# Patient Record
Sex: FEMALE | Race: WHITE | NOT HISPANIC OR LATINO | Employment: OTHER | URBAN - METROPOLITAN AREA
[De-identification: names, ages, dates, MRNs, and addresses within clinical notes are randomized per-mention and may not be internally consistent; named-entity substitution may affect disease eponyms.]

---

## 2020-01-28 ENCOUNTER — OFFICE VISIT (OUTPATIENT)
Dept: NEUROLOGY | Facility: CLINIC | Age: 65
End: 2020-01-28

## 2020-01-28 VITALS
SYSTOLIC BLOOD PRESSURE: 164 MMHG | DIASTOLIC BLOOD PRESSURE: 84 MMHG | HEIGHT: 65 IN | HEART RATE: 71 BPM | BODY MASS INDEX: 31.29 KG/M2 | WEIGHT: 187.8 LBS

## 2020-01-28 DIAGNOSIS — R53.83 FATIGUE, UNSPECIFIED TYPE: Primary | ICD-10-CM

## 2020-01-28 DIAGNOSIS — G44.219 EPISODIC TENSION-TYPE HEADACHE, NOT INTRACTABLE: ICD-10-CM

## 2020-01-28 PROBLEM — I49.9 IRREGULAR HEART RATE: Status: ACTIVE | Noted: 2020-01-28

## 2020-01-28 PROBLEM — I10 HYPERTENSION, BENIGN: Status: ACTIVE | Noted: 2020-01-28

## 2020-01-28 PROBLEM — E03.9 HYPOTHYROIDISM: Status: ACTIVE | Noted: 2020-01-28

## 2020-01-28 PROBLEM — K21.9 GERD (GASTROESOPHAGEAL REFLUX DISEASE): Status: ACTIVE | Noted: 2020-01-28

## 2020-01-28 PROCEDURE — 99203 OFFICE O/P NEW LOW 30 MIN: CPT | Performed by: PSYCHIATRY & NEUROLOGY

## 2020-01-28 RX ORDER — LEVOTHYROXINE SODIUM 50 MCG
TABLET ORAL
COMMUNITY
Start: 2020-01-10

## 2020-01-28 RX ORDER — FLUTICASONE PROPIONATE 50 MCG
SPRAY, SUSPENSION (ML) NASAL
COMMUNITY
Start: 2019-12-13

## 2020-01-28 RX ORDER — ASPIRIN 81 MG/1
81 TABLET, CHEWABLE ORAL DAILY
COMMUNITY

## 2020-01-28 RX ORDER — HYDROCHLOROTHIAZIDE 25 MG/1
TABLET ORAL
COMMUNITY
Start: 2020-01-27

## 2020-01-28 RX ORDER — POTASSIUM CITRATE 5 MEQ/1
TABLET, EXTENDED RELEASE ORAL
COMMUNITY
End: 2020-09-22

## 2020-01-28 RX ORDER — LATANOPROST 50 UG/ML
SOLUTION/ DROPS OPHTHALMIC
COMMUNITY
Start: 2020-01-03

## 2020-01-28 RX ORDER — ATENOLOL 25 MG/1
TABLET ORAL
COMMUNITY
Start: 2020-01-10 | End: 2022-12-24 | Stop reason: SDUPTHER

## 2020-01-28 RX ORDER — PANTOPRAZOLE SODIUM 40 MG/1
TABLET, DELAYED RELEASE ORAL
COMMUNITY
Start: 2020-01-02

## 2020-01-28 NOTE — PROGRESS NOTES
"Subjective: Headaches    Patient ID: Emir Kim is a 64 y.o. female.    CHIEF COMPLAINT: Frontal area eye sinus pain    New neuro    Frontal connelly, come and go, relieved with OTC medication infrequent  Probably more in spring and fall  One \"migraine\" light sensitive and nausea years ago.     Mother had recurrent headaches. When old when had strokes. At age 84,  at age 86    Patient is most concerned about her risk factors for stroke given her mother had strokes.  Patient is not diabetic.  She is on medication for hypertension.  She does not have hypercholesterolemia.  Her recent lipid panel was normal except for slightly elevated HDL.  She does take aspirin daily 81 mg.     Patient fatigue, and has woke her self up snoring,   has been told she had left deviated septum   averages 6-7 hours of sleep per night wakes up 1 time to use the bathroom.   Well rested during the day.    The following portions of the patient's history were reviewed and updated as appropriate: allergies, current medications, past family history, past medical history, past social history, past surgical history and problem list.      Family History   Problem Relation Age of Onset   • Stroke Mother    • Heart disease Father        Past Medical History:   Diagnosis Date   • Environmental and seasonal allergies    • Floppy mitral valve    • GERD (gastroesophageal reflux disease)    • Hypertension    • Hypothyroidism (acquired)    • Migraine        Social History     Socioeconomic History   • Marital status:      Spouse name: Not on file   • Number of children: Not on file   • Years of education: Not on file   • Highest education level: Not on file   Tobacco Use   • Smoking status: Never Smoker   • Smokeless tobacco: Never Used   Substance and Sexual Activity   • Alcohol use: Not Currently   • Drug use: Not Currently   • Sexual activity: Defer         Current Outpatient Medications:   •  aspirin 81 MG chewable tablet, Chew 81 mg Daily., " Disp: , Rfl:   •  atenolol (TENORMIN) 25 MG tablet, , Disp: , Rfl:   •  Calcium Carb-Cholecalciferol (CALCIUM 1000 + D PO), Take  by mouth., Disp: , Rfl:   •  Cholecalciferol (VITAMIN D3) 125 MCG (5000 UT) capsule capsule, Take 5,000 Units by mouth Daily., Disp: , Rfl:   •  fluticasone (FLONASE) 50 MCG/ACT nasal spray, , Disp: , Rfl:   •  hydroCHLOROthiazide (HYDRODIURIL) 25 MG tablet, , Disp: , Rfl:   •  latanoprost (XALATAN) 0.005 % ophthalmic solution, , Disp: , Rfl:   •  pantoprazole (PROTONIX) 40 MG EC tablet, , Disp: , Rfl:   •  potassium citrate (UROCIT-K) 5 MEQ (540 MG) CR tablet, Take  by mouth 3 (Three) Times a Day With Meals., Disp: , Rfl:   •  SYNTHROID 50 MCG tablet, , Disp: , Rfl:     Review of Systems   Constitutional: Positive for fatigue. Negative for appetite change.   HENT: Negative for congestion, sinus pressure and sinus pain.    Eyes: Positive for visual disturbance. Negative for pain and itching.   Respiratory: Negative for cough and shortness of breath.    Cardiovascular: Negative for chest pain and palpitations.   Gastrointestinal: Negative for constipation and diarrhea.   Endocrine: Negative for cold intolerance and heat intolerance.   Genitourinary: Negative for difficulty urinating and frequency.   Musculoskeletal: Positive for back pain. Negative for neck pain.   Allergic/Immunologic: Positive for environmental allergies.   Neurological: Positive for headaches. Negative for dizziness, tremors, seizures, syncope, facial asymmetry, speech difficulty, weakness, light-headedness and numbness.   Psychiatric/Behavioral: Negative for agitation and confusion.        I have reviewed ROS completed by medical assistant.     Objective:    Neurologic Exam     Mental Status   Oriented to person, place, and time.   Attention: normal.   Level of consciousness: alert    Cranial Nerves   Cranial nerves II through XII intact.     CN III, IV, VI   Pupils are equal, round, and reactive to light.  Extraocular  motions are normal.     Motor Exam   Muscle bulk: normal  Overall muscle tone: normal  Right arm tone: normal  Right arm pronator drift: absent  Left arm pronator drift: absent  Right leg tone: normal  Left leg tone: normal    Strength   Strength 5/5 throughout.     Sensory Exam   Light touch normal.   Proprioception normal.     Gait, Coordination, and Reflexes     Gait  Gait: normal    Coordination   Romberg: negative  Finger to nose coordination: normal    Reflexes   Reflexes 2+ except as noted.       Physical Exam   Constitutional: She is oriented to person, place, and time. She appears well-developed and well-nourished.   HENT:   Nose: Nose normal.   Mouth/Throat: Oropharynx is clear and moist.   Eyes: Pupils are equal, round, and reactive to light. Conjunctivae and EOM are normal.   Neck:   No carotid bruits   Cardiovascular: Normal rate, regular rhythm and normal heart sounds.   Pulmonary/Chest: Effort normal and breath sounds normal.   Musculoskeletal: Normal range of motion. She exhibits no edema or deformity.   Neurological: She is alert and oriented to person, place, and time. She has normal strength. She has a normal Finger-Nose-Finger Test and a normal Romberg Test. Gait normal.   Psychiatric: She has a normal mood and affect. Her behavior is normal.   Vitals reviewed.      Assessment/Plan:    Emir was seen today for headache.    Diagnoses and all orders for this visit:    Fatigue, unspecified type  -     Cancel: Vitamin B12; Future  -     Cancel: Methylmalonic Acid, Serum; Future  -     Vitamin B12; Future  -     Methylmalonic Acid, Serum; Future    Episodic tension-type headache, not intractable    This patient has minor tension headaches which do not require any treatment or further evaluation.    She is most concerned about her risk factors for stroke.  An MRI of the brain is not indicated as she has no indication for an MRI of the brain given her current symptoms and normal examination.  Imaging of  the brain will not reduce her risk for stroke or give any indication of her risk for stroke.  She apparently had an MRI of the brain some years in the past which showed some white matter changes which is common with microvascular disease.  The major points in stroke risk reduction were discussed.  These include #1 and most importantly controlling her blood pressure with a goal of 130/70 or less to eliminate any risk of stroke associated with hypertension.  #2 continue to monitor her blood sugar and lipids #3 she is encouraged to exercise with pacifically a aerobic exercise.  Currently she has no significant symptoms suggesting sleep disordered breathing however if she were to start snoring or have other indications of sleep apnea this would need to be investigated.  It has been recommended that the B12 level be above 500 to reduce risk of stroke therefore will order B12 level.  Lastly it is reasonable to obtain a carotid Doppler study which can be obtained most cheaply through the community screening programs    The patient was asked to follow-up with her primary care physician and return here as needed        EPWORTH SLEEPINESS SCALE  Sitting and reading  3  WatchingTV  3  Sitting, inactive, in a public place  0  As a passenger in a car for 1 hour w/o a break  0  Lying down to rest in the afternoon  0  Sitting and talking to someone  0  Sitting quietly after a lunch  0  In a car, while stopped for traffic or a light  0  Total 6        This document has been electronically signed by Joseph Seipel, MD on January 29, 2020 8:46 AM

## 2020-02-02 ENCOUNTER — RESULTS ENCOUNTER (OUTPATIENT)
Dept: NEUROLOGY | Facility: CLINIC | Age: 65
End: 2020-02-02

## 2020-02-02 DIAGNOSIS — R53.83 FATIGUE, UNSPECIFIED TYPE: ICD-10-CM

## 2020-09-16 RX ORDER — OMEGA-3S/DHA/EPA/FISH OIL/D3 300MG-1000
1 CAPSULE ORAL DAILY
COMMUNITY
End: 2020-09-22

## 2020-09-22 ENCOUNTER — OFFICE VISIT (OUTPATIENT)
Dept: ENDOCRINOLOGY | Facility: CLINIC | Age: 65
End: 2020-09-22

## 2020-09-22 VITALS
SYSTOLIC BLOOD PRESSURE: 155 MMHG | HEIGHT: 65 IN | HEART RATE: 64 BPM | WEIGHT: 186 LBS | BODY MASS INDEX: 30.99 KG/M2 | DIASTOLIC BLOOD PRESSURE: 90 MMHG | OXYGEN SATURATION: 97 % | TEMPERATURE: 97.5 F

## 2020-09-22 DIAGNOSIS — E04.0 SIMPLE GOITER: Primary | ICD-10-CM

## 2020-09-22 DIAGNOSIS — E03.9 ACQUIRED HYPOTHYROIDISM: ICD-10-CM

## 2020-09-22 PROCEDURE — 99203 OFFICE O/P NEW LOW 30 MIN: CPT | Performed by: INTERNAL MEDICINE

## 2020-09-22 RX ORDER — METHYLPREDNISOLONE 4 MG/1
TABLET ORAL
COMMUNITY
Start: 2020-07-01 | End: 2020-09-22

## 2020-09-22 RX ORDER — POTASSIUM BICARBONATE 782 MG/1
20 TABLET, EFFERVESCENT ORAL DAILY
COMMUNITY

## 2020-09-22 NOTE — PATIENT INSTRUCTIONS
Continue current medications  Follow-up as needed.  Please follow-up with your family physician regarding soft tissue in the neck which is most likely lipoma.  
done

## 2020-09-22 NOTE — PROGRESS NOTES
Endocrine Consult Outpatient  Referred by Dr. Boyer for thyroid consultation  Patient Care Team:  David Man MD as PCP - General  David Man MD as PCP - Claims Attributed     Chief Complaint: Goiter and hypothyroidism    HPI: 65-year-old female with history of hypertension and hypothyroidism started feeling a soft tissue in front of the neck about 6 to 7 months ago and she underwent testing including a thyroid ultrasound is now referred here for further evaluation and management.  With regards to hypothyroidism she has been on high levothyroxine for last 30 to 35 years.  She is taking it on regular basis.  Goiter: There is no family history of thyroid cancer or radiation exposure.  No trouble swallowing or choking and there is no persistent change in the voice or hoarseness.    Old records reviewed: A thyroid ultrasound done was May 20, 2020 showed a normal thyroid gland and no nodule was seen.  Right thyroid was about 4 cm in size and the left was about 3.7 cm in size and the estimates was 3.1 mm.  Labs from May 14, 2020 showed a TSH of 1.860.    Past Medical History:   Diagnosis Date   • Cataract    • Environmental and seasonal allergies    • Floppy mitral valve    • GERD (gastroesophageal reflux disease)    • Hypertension    • Hypothyroidism (acquired)    • Migraine        Social History     Socioeconomic History   • Marital status:      Spouse name: Not on file   • Number of children: Not on file   • Years of education: Not on file   • Highest education level: Not on file   Tobacco Use   • Smoking status: Never Smoker   • Smokeless tobacco: Never Used   Substance and Sexual Activity   • Alcohol use: Not Currently   • Drug use: Not Currently   • Sexual activity: Defer       Family History   Problem Relation Age of Onset   • Stroke Mother    • Hypertension Mother    • Thyroid disease Mother    • Atrial fibrillation Mother    • Heart disease Father    • Diabetes Father    • Heart  failure Father        Allergies   Allergen Reactions   • Nitrofurantoin Nausea Only   • Penicillins Hives   • Sulfa Antibiotics Hives   • Sulfamethoxazole-Trimethoprim GI Intolerance       ROS:   Constitutional:  Denies fatigue, tiredness.    Eyes:  Denies change in visual acuity   HENT:  Denies nasal congestion or sore throat   Respiratory: denies cough, shortness of breath.   Cardiovascular:  denies chest pain, edema   GI:  Denies abdominal pain, nausea, vomiting.    :  Denies dysuria   Musculoskeletal:  Denies back pain or joint pain   Integument:  Denies dry skin, rash   Neurologic:  Denies headache, focal weakness or sensory changes   Endocrine:  Denies polyuria or polydipsia   Psychiatric:  Denies depression or anxiety      Vitals:    09/22/20 0836   BP: 155/90   Pulse: 64   Temp: 97.5 °F (36.4 °C)   SpO2: 97%        Physical Exam:  GEN: NAD, conversant  EYES: EOMI, PERRL, no conjunctival erythema  NECK: no thyromegaly, full ROM   CV: RRR, no murmurs/rubs/gallops, no peripheral edema  LUNG: CTAB, no wheezes/rales/ronchi  SKIN: no rashes, no acanthosis  MSK: no deformities, full ROM of all extremities  NEURO: no tremors, DTR normal  PSYCH: AOX3, appropriate mood, affect normal      Results Review:     I reviewed the patient's new clinical results.    Medication Review: Reviewed.       Current Outpatient Medications:   •  aspirin 81 MG chewable tablet, Chew 81 mg Daily., Disp: , Rfl:   •  atenolol (TENORMIN) 25 MG tablet, , Disp: , Rfl:   •  Cholecalciferol (VITAMIN D3) 125 MCG (5000 UT) capsule capsule, Take 5,000 Units by mouth Daily., Disp: , Rfl:   •  fluticasone (FLONASE) 50 MCG/ACT nasal spray, , Disp: , Rfl:   •  hydroCHLOROthiazide (HYDRODIURIL) 25 MG tablet, 1/2 tablet daily, Disp: , Rfl:   •  latanoprost (XALATAN) 0.005 % ophthalmic solution, , Disp: , Rfl:   •  pantoprazole (PROTONIX) 40 MG EC tablet, , Disp: , Rfl:   •  Potassium Bicarb-Citric Acid (Effer-K) 20 MEQ effervescent tablet, Take 20 mEq  by mouth Daily., Disp: , Rfl:   •  SYNTHROID 50 MCG tablet, , Disp: , Rfl:     Assessment/Plan   1.  Goiter: She has a small soft tissue in front of the neck right below the thyroid area which is most likely a small fat accumulation.  Ultrasound did not show any nodules.  I have reassured her that this is most likely benign fat accumulation and she can wait and watch and see if it grows then option would be to take it out surgically.  She verbalized understanding.    2.  Hypothyroidism: Her TSH is normal so at this time we will continue levothyroxine 50 mcg p.o. daily and she can follow-up with her family physician.       Mario Ware MD FACE.

## 2020-11-02 DIAGNOSIS — E03.9 ACQUIRED HYPOTHYROIDISM: Primary | ICD-10-CM

## 2020-11-02 DIAGNOSIS — E04.0 SIMPLE GOITER: ICD-10-CM

## 2020-11-10 LAB
ACTH PLAS-MCNC: NORMAL PG/ML
CORTIS SERPL-MCNC: 15.1 UG/DL
IGF-I SERPL-MCNC: 96 NG/ML (ref 52–196)
PROLACTIN SERPL-MCNC: 10.3 NG/ML (ref 4.8–23.3)
REQUEST PROBLEM: NORMAL
T4 FREE SERPL-MCNC: 1.22 NG/DL (ref 0.82–1.77)
TSH SERPL DL<=0.005 MIU/L-ACNC: 1.18 UIU/ML (ref 0.45–4.5)

## 2020-11-11 ENCOUNTER — TELEPHONE (OUTPATIENT)
Dept: ENDOCRINOLOGY | Facility: CLINIC | Age: 65
End: 2020-11-11

## 2020-11-11 NOTE — TELEPHONE ENCOUNTER
[facility] Please advise.

## 2020-11-12 NOTE — TELEPHONE ENCOUNTER
We checked cortisol, thyroid test, prolactin and growth hormone and they were all normal.  Please notify patient.

## 2021-04-19 ENCOUNTER — HOSPITAL ENCOUNTER (OUTPATIENT)
Dept: CARDIOLOGY | Facility: HOSPITAL | Age: 66
Discharge: HOME | End: 2021-04-19
Admitting: GENERAL PRACTICE

## 2021-04-19 VITALS — WEIGHT: 186 LBS | BODY MASS INDEX: 30.99 KG/M2 | HEIGHT: 65 IN

## 2021-04-19 DIAGNOSIS — I07.1 RHEUMATIC TRICUSPID VALVE REGURGITATION: ICD-10-CM

## 2021-04-19 DIAGNOSIS — I34.89 MYXOID TRANSFORMATION OF MITRAL VALVE: ICD-10-CM

## 2021-04-19 PROCEDURE — 93306 TTE W/DOPPLER COMPLETE: CPT

## 2021-04-19 PROCEDURE — 93306 TTE W/DOPPLER COMPLETE: CPT | Performed by: INTERNAL MEDICINE

## 2021-04-20 LAB
ASCENDING AORTA: 2.9 CM
BH CV ECHO MEAS - ACS: 2.1 CM
BH CV ECHO MEAS - AI DEC SLOPE: 248.7 CM/SEC^2
BH CV ECHO MEAS - AI DEC TIME: 1.5 SEC
BH CV ECHO MEAS - AI MAX PG: 57.5 MMHG
BH CV ECHO MEAS - AI MAX VEL: 379 CM/SEC
BH CV ECHO MEAS - AI P1/2T: 446.4 MSEC
BH CV ECHO MEAS - AO MAX PG (FULL): 2.9 MMHG
BH CV ECHO MEAS - AO MAX PG: 7.3 MMHG
BH CV ECHO MEAS - AO MEAN PG (FULL): 1.9 MMHG
BH CV ECHO MEAS - AO MEAN PG: 4.3 MMHG
BH CV ECHO MEAS - AO ROOT AREA (BSA CORRECTED): 1.5
BH CV ECHO MEAS - AO ROOT AREA: 6.9 CM^2
BH CV ECHO MEAS - AO ROOT DIAM: 3 CM
BH CV ECHO MEAS - AO V2 MAX: 135.1 CM/SEC
BH CV ECHO MEAS - AO V2 MEAN: 99.8 CM/SEC
BH CV ECHO MEAS - AO V2 VTI: 29.9 CM
BH CV ECHO MEAS - ASC AORTA: 2.9 CM
BH CV ECHO MEAS - AVA(I,A): 2.7 CM^2
BH CV ECHO MEAS - AVA(I,D): 2.7 CM^2
BH CV ECHO MEAS - AVA(V,A): 2.3 CM^2
BH CV ECHO MEAS - AVA(V,D): 2.3 CM^2
BH CV ECHO MEAS - BSA(HAYCOCK): 2 M^2
BH CV ECHO MEAS - BSA: 1.9 M^2
BH CV ECHO MEAS - BZI_BMI: 31 KILOGRAMS/M^2
BH CV ECHO MEAS - BZI_METRIC_HEIGHT: 165.1 CM
BH CV ECHO MEAS - BZI_METRIC_WEIGHT: 84.4 KG
BH CV ECHO MEAS - EDV(CUBED): 58.1 ML
BH CV ECHO MEAS - EDV(MOD-SP2): 80.8 ML
BH CV ECHO MEAS - EDV(MOD-SP4): 74.7 ML
BH CV ECHO MEAS - EDV(TEICH): 64.8 ML
BH CV ECHO MEAS - EF(CUBED): 72.2 %
BH CV ECHO MEAS - EF(MOD-BP): 59 %
BH CV ECHO MEAS - EF(MOD-SP2): 56.2 %
BH CV ECHO MEAS - EF(MOD-SP4): 64.4 %
BH CV ECHO MEAS - EF(TEICH): 64.6 %
BH CV ECHO MEAS - ESV(CUBED): 16.1 ML
BH CV ECHO MEAS - ESV(MOD-SP2): 35.4 ML
BH CV ECHO MEAS - ESV(MOD-SP4): 26.6 ML
BH CV ECHO MEAS - ESV(TEICH): 22.9 ML
BH CV ECHO MEAS - FS: 34.8 %
BH CV ECHO MEAS - IVS/LVPW: 1.2
BH CV ECHO MEAS - IVSD: 1.4 CM
BH CV ECHO MEAS - LA DIMENSION(2D): 3.9 CM
BH CV ECHO MEAS - LA DIMENSION: 4.2 CM
BH CV ECHO MEAS - LA/AO: 1.4
BH CV ECHO MEAS - LAT PEAK E' VEL: 7 CM/SEC
BH CV ECHO MEAS - LV DIASTOLIC VOL/BSA (35-75): 38.9 ML/M^2
BH CV ECHO MEAS - LV MASS(C)D: 171.2 GRAMS
BH CV ECHO MEAS - LV MASS(C)DI: 89.2 GRAMS/M^2
BH CV ECHO MEAS - LV MAX PG: 4.4 MMHG
BH CV ECHO MEAS - LV MEAN PG: 2.4 MMHG
BH CV ECHO MEAS - LV SYSTOLIC VOL/BSA (12-30): 13.8 ML/M^2
BH CV ECHO MEAS - LV V1 MAX: 104.3 CM/SEC
BH CV ECHO MEAS - LV V1 MEAN: 72.7 CM/SEC
BH CV ECHO MEAS - LV V1 VTI: 26.6 CM
BH CV ECHO MEAS - LVIDD: 3.9 CM
BH CV ECHO MEAS - LVIDS: 2.5 CM
BH CV ECHO MEAS - LVOT AREA: 3 CM^2
BH CV ECHO MEAS - LVOT DIAM: 2 CM
BH CV ECHO MEAS - LVPWD: 1.2 CM
BH CV ECHO MEAS - MED PEAK E' VEL: 6 CM/SEC
BH CV ECHO MEAS - MV A MAX VEL: 80.1 CM/SEC
BH CV ECHO MEAS - MV DEC SLOPE: 353.9 CM/SEC^2
BH CV ECHO MEAS - MV DEC TIME: 0.23 SEC
BH CV ECHO MEAS - MV E MAX VEL: 79.8 CM/SEC
BH CV ECHO MEAS - MV E/A: 1
BH CV ECHO MEAS - MV MAX PG: 2.8 MMHG
BH CV ECHO MEAS - MV MEAN PG: 0.95 MMHG
BH CV ECHO MEAS - MV P1/2T: 57 MSEC
BH CV ECHO MEAS - MV V2 MAX: 83 CM/SEC
BH CV ECHO MEAS - MV V2 MEAN: 42.8 CM/SEC
BH CV ECHO MEAS - MV V2 VTI: 19.4 CM
BH CV ECHO MEAS - MVA(P1/2T): 3.9 CM2
BH CV ECHO MEAS - MVA(VTI): 4.1 CM^2
BH CV ECHO MEAS - PA ACC TIME: 0.15 SEC
BH CV ECHO MEAS - PA MAX PG (FULL): 0.54 MMHG
BH CV ECHO MEAS - PA MAX PG: 2 MMHG
BH CV ECHO MEAS - PA MEAN PG (FULL): 0.36 MMHG
BH CV ECHO MEAS - PA MEAN PG: 1.3 MMHG
BH CV ECHO MEAS - PA PR(ACCEL): 13.5 MMHG
BH CV ECHO MEAS - PA V2 MAX: 69.9 CM/SEC
BH CV ECHO MEAS - PA V2 MEAN: 55.8 CM/SEC
BH CV ECHO MEAS - PA V2 VTI: 17 CM
BH CV ECHO MEAS - PAPD(PI EDV): 10 MMHG
BH CV ECHO MEAS - PI END-D VEL: 72.9 CM/SEC
BH CV ECHO MEAS - PULM A REVS DUR: 0.09 SEC
BH CV ECHO MEAS - PULM A REVS VEL: 23.1 CM/SEC
BH CV ECHO MEAS - PULM DIAS VEL: 40.7 CM/SEC
BH CV ECHO MEAS - PULM S/D: 1.8
BH CV ECHO MEAS - PULM SYS VEL: 72.5 CM/SEC
BH CV ECHO MEAS - RAP SYSTOLE: 8 MMHG
BH CV ECHO MEAS - RV MAX PG: 1.4 MMHG
BH CV ECHO MEAS - RV MEAN PG: 0.95 MMHG
BH CV ECHO MEAS - RV V1 MAX: 59.6 CM/SEC
BH CV ECHO MEAS - RV V1 MEAN: 46.8 CM/SEC
BH CV ECHO MEAS - RV V1 VTI: 16 CM
BH CV ECHO MEAS - RVSP: 33.6 MMHG
BH CV ECHO MEAS - SI(AO): 108 ML/M^2
BH CV ECHO MEAS - SI(CUBED): 21.9 ML/M^2
BH CV ECHO MEAS - SI(LVOT): 41.6 ML/M^2
BH CV ECHO MEAS - SI(MOD-SP2): 23.7 ML/M^2
BH CV ECHO MEAS - SI(MOD-SP4): 25.1 ML/M^2
BH CV ECHO MEAS - SI(TEICH): 21.9 ML/M^2
BH CV ECHO MEAS - SV(AO): 207.2 ML
BH CV ECHO MEAS - SV(CUBED): 42 ML
BH CV ECHO MEAS - SV(LVOT): 79.8 ML
BH CV ECHO MEAS - SV(MOD-SP2): 45.4 ML
BH CV ECHO MEAS - SV(MOD-SP4): 48.1 ML
BH CV ECHO MEAS - SV(TEICH): 41.9 ML
BH CV ECHO MEAS - TAPSE (>1.6): 1.9 CM
BH CV ECHO MEAS - TR MAX PG: 26 MMHG
BH CV ECHO MEAS - TR MAX VEL: 253.1 CM/SEC
BH CV ECHO MEASUREMENTS AVERAGE E/E' RATIO: 12.28
BH CV XLRA - RV BASE: 3.4 CM
BH CV XLRA - RV MID: 2.8 CM
BH CV XLRA - TDI S': 11 CM/SEC
LEFT ATRIUM VOLUME INDEX: 30 ML/M2
LEFT ATRIUM VOLUME: 57 CM3
LV EF 2D ECHO EST: 60 %

## 2021-04-26 ENCOUNTER — APPOINTMENT (OUTPATIENT)
Dept: CARDIOLOGY | Facility: HOSPITAL | Age: 66
End: 2021-04-26

## 2022-05-02 ENCOUNTER — HOSPITAL ENCOUNTER (OUTPATIENT)
Dept: CARDIOLOGY | Facility: HOSPITAL | Age: 67
Discharge: HOME | End: 2022-05-02
Admitting: INTERNAL MEDICINE

## 2022-05-02 VITALS
BODY MASS INDEX: 31 KG/M2 | WEIGHT: 186.07 LBS | HEIGHT: 65 IN | SYSTOLIC BLOOD PRESSURE: 173 MMHG | DIASTOLIC BLOOD PRESSURE: 83 MMHG

## 2022-05-02 DIAGNOSIS — I49.9 IRREGULAR HEART RATE: ICD-10-CM

## 2022-05-02 PROCEDURE — 93306 TTE W/DOPPLER COMPLETE: CPT | Performed by: INTERNAL MEDICINE

## 2022-05-02 PROCEDURE — 93306 TTE W/DOPPLER COMPLETE: CPT

## 2022-05-04 LAB
BH CV ECHO MEAS - ACS: 1.56 CM
BH CV ECHO MEAS - AI P1/2T: 703.4 MSEC
BH CV ECHO MEAS - AO MAX PG: 9.4 MMHG
BH CV ECHO MEAS - AO MEAN PG: 4.4 MMHG
BH CV ECHO MEAS - AO ROOT DIAM: 2.8 CM
BH CV ECHO MEAS - AO V2 MAX: 153.5 CM/SEC
BH CV ECHO MEAS - AO V2 VTI: 34.8 CM
BH CV ECHO MEAS - AVA(I,D): 2.4 CM2
BH CV ECHO MEAS - EDV(CUBED): 83.3 ML
BH CV ECHO MEAS - EDV(MOD-SP4): 62.2 ML
BH CV ECHO MEAS - EF(MOD-BP): 65 %
BH CV ECHO MEAS - EF(MOD-SP4): 62.8 %
BH CV ECHO MEAS - ESV(CUBED): 23.9 ML
BH CV ECHO MEAS - ESV(MOD-SP4): 23.1 ML
BH CV ECHO MEAS - FS: 34.1 %
BH CV ECHO MEAS - IVS/LVPW: 1.03 CM
BH CV ECHO MEAS - IVSD: 1.05 CM
BH CV ECHO MEAS - LA DIMENSION: 3.6 CM
BH CV ECHO MEAS - LV DIASTOLIC VOL/BSA (35-75): 33.7 CM2
BH CV ECHO MEAS - LV MASS(C)D: 153.5 GRAMS
BH CV ECHO MEAS - LV MAX PG: 4.4 MMHG
BH CV ECHO MEAS - LV MEAN PG: 2.8 MMHG
BH CV ECHO MEAS - LV SYSTOLIC VOL/BSA (12-30): 12.5 CM2
BH CV ECHO MEAS - LV V1 MAX: 105.4 CM/SEC
BH CV ECHO MEAS - LV V1 VTI: 27.9 CM
BH CV ECHO MEAS - LVIDD: 4.4 CM
BH CV ECHO MEAS - LVIDS: 2.9 CM
BH CV ECHO MEAS - LVOT AREA: 3 CM2
BH CV ECHO MEAS - LVOT DIAM: 1.95 CM
BH CV ECHO MEAS - LVPWD: 1.02 CM
BH CV ECHO MEAS - MR MAX PG: 115.3 MMHG
BH CV ECHO MEAS - MR MAX VEL: 536.9 CM/SEC
BH CV ECHO MEAS - MV A MAX VEL: 83.2 CM/SEC
BH CV ECHO MEAS - MV DEC SLOPE: 464.1 CM/SEC2
BH CV ECHO MEAS - MV DEC TIME: 0.21 MSEC
BH CV ECHO MEAS - MV E MAX VEL: 97.5 CM/SEC
BH CV ECHO MEAS - MV E/A: 1.17
BH CV ECHO MEAS - MV MAX PG: 4.9 MMHG
BH CV ECHO MEAS - MV MEAN PG: 1.8 MMHG
BH CV ECHO MEAS - MV V2 VTI: 28.9 CM
BH CV ECHO MEAS - MVA(VTI): 2.9 CM2
BH CV ECHO MEAS - PA ACC TIME: 0.14 SEC
BH CV ECHO MEAS - PA PR(ACCEL): 16.1 MMHG
BH CV ECHO MEAS - PA V2 MAX: 88.7 CM/SEC
BH CV ECHO MEAS - PI END-D VEL: 71.1 CM/SEC
BH CV ECHO MEAS - PULM A REVS DUR: 0.15 SEC
BH CV ECHO MEAS - PULM A REVS VEL: 32.9 CM/SEC
BH CV ECHO MEAS - PULM DIAS VEL: 41.3 CM/SEC
BH CV ECHO MEAS - PULM S/D: 2.01
BH CV ECHO MEAS - PULM SYS VEL: 83 CM/SEC
BH CV ECHO MEAS - RAP SYSTOLE: 10 MMHG
BH CV ECHO MEAS - RVSP: 42.1 MMHG
BH CV ECHO MEAS - SI(MOD-SP4): 21.2 ML/M2
BH CV ECHO MEAS - SV(LVOT): 83.6 ML
BH CV ECHO MEAS - SV(MOD-SP4): 39.1 ML
BH CV ECHO MEAS - TAPSE (>1.6): 1.9 CM
BH CV ECHO MEAS - TR MAX PG: 32.1 MMHG
BH CV ECHO MEAS - TR MAX VEL: 283.1 CM/SEC
BH CV XLRA - RV BASE: 3.7 CM
BH CV XLRA - RV MID: 2.3 CM
LV EF 2D ECHO EST: 65 %
MAXIMAL PREDICTED HEART RATE: 152 BPM
STRESS TARGET HR: 129 BPM

## 2022-11-16 ENCOUNTER — OFFICE (OUTPATIENT)
Dept: URBAN - METROPOLITAN AREA CLINIC 64 | Facility: CLINIC | Age: 67
End: 2022-11-16

## 2022-11-16 VITALS
DIASTOLIC BLOOD PRESSURE: 75 MMHG | WEIGHT: 170 LBS | HEART RATE: 71 BPM | HEIGHT: 65 IN | SYSTOLIC BLOOD PRESSURE: 154 MMHG

## 2022-11-16 DIAGNOSIS — A04.72 ENTEROCOLITIS DUE TO CLOSTRIDIUM DIFFICILE, NOT SPECIFIED AS: ICD-10-CM

## 2022-11-16 DIAGNOSIS — R19.7 DIARRHEA, UNSPECIFIED: ICD-10-CM

## 2022-11-16 PROCEDURE — 99203 OFFICE O/P NEW LOW 30 MIN: CPT | Performed by: NURSE PRACTITIONER

## 2022-11-16 RX ORDER — SACCHAROMYCES BOULARDII 50 MG
500 CAPSULE ORAL
Qty: 60 | Refills: 4 | Status: COMPLETED
Start: 2022-11-16 | End: 2023-03-06

## 2022-11-17 ENCOUNTER — OFFICE (OUTPATIENT)
Dept: URBAN - METROPOLITAN AREA LAB 2 | Facility: LAB | Age: 67
End: 2022-11-17

## 2022-11-17 DIAGNOSIS — R19.7 DIARRHEA, UNSPECIFIED: ICD-10-CM

## 2022-11-17 DIAGNOSIS — A08.11 ACUTE GASTROENTEROPATHY DUE TO NORWALK AGENT: ICD-10-CM

## 2022-11-17 DIAGNOSIS — A02.9 SALMONELLA INFECTION, UNSPECIFIED: ICD-10-CM

## 2022-11-17 PROCEDURE — 87505 NFCT AGENT DETECTION GI: CPT | Performed by: NURSE PRACTITIONER

## 2022-11-17 PROCEDURE — 87449 NOS EACH ORGANISM AG IA: CPT | Performed by: NURSE PRACTITIONER

## 2022-11-17 PROCEDURE — 87324 CLOSTRIDIUM AG IA: CPT | Mod: 59 | Performed by: NURSE PRACTITIONER

## 2022-12-24 ENCOUNTER — HOSPITAL ENCOUNTER (EMERGENCY)
Facility: HOSPITAL | Age: 67
Discharge: HOME | End: 2022-12-24
Attending: EMERGENCY MEDICINE | Admitting: EMERGENCY MEDICINE

## 2022-12-24 ENCOUNTER — APPOINTMENT (OUTPATIENT)
Dept: GENERAL RADIOLOGY | Facility: HOSPITAL | Age: 67
End: 2022-12-24

## 2022-12-24 VITALS
SYSTOLIC BLOOD PRESSURE: 170 MMHG | DIASTOLIC BLOOD PRESSURE: 79 MMHG | RESPIRATION RATE: 16 BRPM | WEIGHT: 172.18 LBS | TEMPERATURE: 97.6 F | BODY MASS INDEX: 28.69 KG/M2 | HEIGHT: 65 IN | HEART RATE: 82 BPM | OXYGEN SATURATION: 98 %

## 2022-12-24 DIAGNOSIS — I10 ASYMPTOMATIC HYPERTENSION: Primary | ICD-10-CM

## 2022-12-24 DIAGNOSIS — E87.6 HYPOKALEMIA: ICD-10-CM

## 2022-12-24 DIAGNOSIS — E87.1 HYPONATREMIA: ICD-10-CM

## 2022-12-24 LAB
ALBUMIN SERPL-MCNC: 4.2 G/DL (ref 3.5–5.2)
ALBUMIN/GLOB SERPL: 1.5 G/DL
ALP SERPL-CCNC: 98 U/L (ref 39–117)
ALT SERPL W P-5'-P-CCNC: 13 U/L (ref 1–33)
ANION GAP SERPL CALCULATED.3IONS-SCNC: 12 MMOL/L (ref 5–15)
AST SERPL-CCNC: 14 U/L (ref 1–32)
BASOPHILS # BLD AUTO: 0 10*3/MM3 (ref 0–0.2)
BASOPHILS NFR BLD AUTO: 0.5 % (ref 0–1.5)
BILIRUB SERPL-MCNC: 0.4 MG/DL (ref 0–1.2)
BUN SERPL-MCNC: 6 MG/DL (ref 8–23)
BUN/CREAT SERPL: 8.1 (ref 7–25)
CALCIUM SPEC-SCNC: 9 MG/DL (ref 8.6–10.5)
CHLORIDE SERPL-SCNC: 85 MMOL/L (ref 98–107)
CO2 SERPL-SCNC: 26 MMOL/L (ref 22–29)
CREAT SERPL-MCNC: 0.74 MG/DL (ref 0.57–1)
DEPRECATED RDW RBC AUTO: 43.3 FL (ref 37–54)
EGFRCR SERPLBLD CKD-EPI 2021: 88.3 ML/MIN/1.73
EOSINOPHIL # BLD AUTO: 0 10*3/MM3 (ref 0–0.4)
EOSINOPHIL NFR BLD AUTO: 0.2 % (ref 0.3–6.2)
ERYTHROCYTE [DISTWIDTH] IN BLOOD BY AUTOMATED COUNT: 13.5 % (ref 12.3–15.4)
GLOBULIN UR ELPH-MCNC: 2.8 GM/DL
GLUCOSE SERPL-MCNC: 104 MG/DL (ref 65–99)
HCT VFR BLD AUTO: 40 % (ref 34–46.6)
HGB BLD-MCNC: 13.5 G/DL (ref 12–15.9)
HOLD SPECIMEN: NORMAL
HOLD SPECIMEN: NORMAL
LYMPHOCYTES # BLD AUTO: 1.3 10*3/MM3 (ref 0.7–3.1)
LYMPHOCYTES NFR BLD AUTO: 23.5 % (ref 19.6–45.3)
MAGNESIUM SERPL-MCNC: 1.8 MG/DL (ref 1.6–2.4)
MCH RBC QN AUTO: 31.5 PG (ref 26.6–33)
MCHC RBC AUTO-ENTMCNC: 33.7 G/DL (ref 31.5–35.7)
MCV RBC AUTO: 93.4 FL (ref 79–97)
MONOCYTES # BLD AUTO: 0.4 10*3/MM3 (ref 0.1–0.9)
MONOCYTES NFR BLD AUTO: 7.9 % (ref 5–12)
NEUTROPHILS NFR BLD AUTO: 3.8 10*3/MM3 (ref 1.7–7)
NEUTROPHILS NFR BLD AUTO: 67.9 % (ref 42.7–76)
NRBC BLD AUTO-RTO: 0 /100 WBC (ref 0–0.2)
PLATELET # BLD AUTO: 286 10*3/MM3 (ref 140–450)
PMV BLD AUTO: 6.4 FL (ref 6–12)
POTASSIUM SERPL-SCNC: 3.2 MMOL/L (ref 3.5–5.2)
PROT SERPL-MCNC: 7 G/DL (ref 6–8.5)
RBC # BLD AUTO: 4.28 10*6/MM3 (ref 3.77–5.28)
SODIUM SERPL-SCNC: 123 MMOL/L (ref 136–145)
TROPONIN T SERPL-MCNC: <0.01 NG/ML (ref 0–0.03)
TSH SERPL DL<=0.05 MIU/L-ACNC: 2.33 UIU/ML (ref 0.27–4.2)
WBC NRBC COR # BLD: 5.6 10*3/MM3 (ref 3.4–10.8)
WHOLE BLOOD HOLD COAG: NORMAL
WHOLE BLOOD HOLD SPECIMEN: NORMAL

## 2022-12-24 PROCEDURE — 93005 ELECTROCARDIOGRAM TRACING: CPT | Performed by: PHYSICIAN ASSISTANT

## 2022-12-24 PROCEDURE — 71045 X-RAY EXAM CHEST 1 VIEW: CPT

## 2022-12-24 PROCEDURE — 84443 ASSAY THYROID STIM HORMONE: CPT | Performed by: PHYSICIAN ASSISTANT

## 2022-12-24 PROCEDURE — 83735 ASSAY OF MAGNESIUM: CPT | Performed by: PHYSICIAN ASSISTANT

## 2022-12-24 PROCEDURE — 99283 EMERGENCY DEPT VISIT LOW MDM: CPT

## 2022-12-24 PROCEDURE — 99284 EMERGENCY DEPT VISIT MOD MDM: CPT

## 2022-12-24 PROCEDURE — 80053 COMPREHEN METABOLIC PANEL: CPT | Performed by: PHYSICIAN ASSISTANT

## 2022-12-24 PROCEDURE — 84484 ASSAY OF TROPONIN QUANT: CPT | Performed by: PHYSICIAN ASSISTANT

## 2022-12-24 PROCEDURE — 96374 THER/PROPH/DIAG INJ IV PUSH: CPT

## 2022-12-24 PROCEDURE — 85025 COMPLETE CBC W/AUTO DIFF WBC: CPT | Performed by: PHYSICIAN ASSISTANT

## 2022-12-24 RX ORDER — ATENOLOL 25 MG/1
50 TABLET ORAL DAILY
Qty: 14 TABLET | Refills: 0 | Status: SHIPPED | OUTPATIENT
Start: 2022-12-24 | End: 2022-12-31

## 2022-12-24 RX ORDER — SODIUM CHLORIDE 0.9 % (FLUSH) 0.9 %
10 SYRINGE (ML) INJECTION AS NEEDED
Status: DISCONTINUED | OUTPATIENT
Start: 2022-12-24 | End: 2022-12-25 | Stop reason: HOSPADM

## 2022-12-24 RX ORDER — LABETALOL HYDROCHLORIDE 5 MG/ML
20 INJECTION, SOLUTION INTRAVENOUS ONCE
Status: COMPLETED | OUTPATIENT
Start: 2022-12-24 | End: 2022-12-24

## 2022-12-24 RX ORDER — POTASSIUM CHLORIDE 20 MEQ/1
40 TABLET, EXTENDED RELEASE ORAL DAILY
Status: DISCONTINUED | OUTPATIENT
Start: 2022-12-24 | End: 2022-12-25 | Stop reason: HOSPADM

## 2022-12-24 RX ORDER — POTASSIUM CHLORIDE 20 MEQ/1
40 TABLET, EXTENDED RELEASE ORAL DAILY
Status: DISCONTINUED | OUTPATIENT
Start: 2022-12-25 | End: 2022-12-24

## 2022-12-24 RX ADMIN — POTASSIUM CHLORIDE 40 MEQ: 1500 TABLET, EXTENDED RELEASE ORAL at 23:28

## 2022-12-24 RX ADMIN — LABETALOL HYDROCHLORIDE 20 MG: 5 INJECTION, SOLUTION INTRAVENOUS at 21:54

## 2022-12-25 NOTE — ED PROVIDER NOTES
Subjective       Patient is a 67-year-old female comes in complaining of high blood pressure throughout the day and that has been as high as 190 systolic and as high as 99 diastolic.  Patient is also had some mild tachycardia around 100 bpm per family.  Patient's states that she would not be here if it was not for her daughters that were worried about her elevated blood pressure.  Patient reports headache about 4 out of 10 throughout the day today but denies thunderclap onset or worst headache of her life.  Patient states nothing seems to make the pain better or worse.  Patient does report some intermittent lightheadedness but denies any near syncope, syncopal episode, chest pain, shortness of breath, urinary symptoms, head injury.  Patient states that she called her primary care provider earlier today who recommended her take an extra 12.5 hydrochlorothiazide but this did not help her symptoms.  Patient denies new or worsening cough, fever or chills.        Review of Systems   Constitutional: Negative for chills, fatigue and fever.   HENT: Negative for congestion, sore throat, tinnitus and trouble swallowing.    Eyes: Negative for photophobia, discharge and visual disturbance.   Respiratory: Negative for cough, shortness of breath and wheezing.    Cardiovascular: Negative for chest pain, palpitations and leg swelling.   Gastrointestinal: Negative for abdominal pain, diarrhea, nausea and vomiting.   Genitourinary: Negative for dysuria, flank pain and urgency.   Musculoskeletal: Negative for arthralgias and myalgias.   Skin: Negative for rash.   Neurological: Positive for light-headedness and headaches. Negative for dizziness, syncope, speech difficulty, weakness and numbness.   Psychiatric/Behavioral: Negative for confusion.       Past Medical History:   Diagnosis Date   • Cataract    • Environmental and seasonal allergies    • Floppy mitral valve    • GERD (gastroesophageal reflux disease)    • Hypertension    •  Hypothyroidism (acquired)    • Migraine        Allergies   Allergen Reactions   • Nitrofurantoin Nausea Only   • Penicillins Hives   • Sulfa Antibiotics Hives   • Sulfamethoxazole-Trimethoprim GI Intolerance       Past Surgical History:   Procedure Laterality Date   • BREAST IMPLANT SURGERY     • TONSILECTOMY, ADENOIDECTOMY, BILATERAL MYRINGOTOMY AND TUBES         Family History   Problem Relation Age of Onset   • Stroke Mother    • Hypertension Mother    • Thyroid disease Mother    • Atrial fibrillation Mother    • Heart disease Father    • Diabetes Father    • Heart failure Father        Social History     Socioeconomic History   • Marital status:    Tobacco Use   • Smoking status: Never   • Smokeless tobacco: Never   Substance and Sexual Activity   • Alcohol use: Not Currently   • Drug use: Not Currently   • Sexual activity: Defer           Objective   Physical Exam  Vitals and nursing note reviewed.   Constitutional:       General: She is not in acute distress.     Appearance: Normal appearance. She is well-developed. She is not diaphoretic.   HENT:      Head: Normocephalic and atraumatic.      Right Ear: External ear normal.      Left Ear: External ear normal.      Nose: Nose normal.      Mouth/Throat:      Mouth: Mucous membranes are moist.   Eyes:      Extraocular Movements: Extraocular movements intact.      Conjunctiva/sclera: Conjunctivae normal.      Pupils: Pupils are equal, round, and reactive to light.   Cardiovascular:      Rate and Rhythm: Normal rate and regular rhythm.      Pulses: Normal pulses.      Heart sounds: Normal heart sounds.      Comments: S1, S2 audible.  Pulmonary:      Effort: Pulmonary effort is normal. No respiratory distress.      Breath sounds: Normal breath sounds. No wheezing, rhonchi or rales.      Comments: On room air.  Abdominal:      General: Bowel sounds are normal. There is no distension.      Palpations: Abdomen is soft.      Tenderness: There is no abdominal  "tenderness. There is no guarding or rebound.   Musculoskeletal:         General: No tenderness or deformity. Normal range of motion.      Cervical back: Normal range of motion.   Skin:     General: Skin is warm.      Capillary Refill: Capillary refill takes less than 2 seconds.      Findings: No erythema or rash.   Neurological:      General: No focal deficit present.      Mental Status: She is alert and oriented to person, place, and time.      Cranial Nerves: No cranial nerve deficit.      Sensory: No sensory deficit.      Motor: No weakness.   Psychiatric:         Mood and Affect: Mood normal.         Behavior: Behavior normal.         Procedures           ED Course      BP (!) 189/84 (BP Location: Left arm, Patient Position: Sitting)   Pulse 88   Temp 97.6 °F (36.4 °C) (Oral)   Resp 16   Ht 165.1 cm (65\")   Wt 78.1 kg (172 lb 2.9 oz)   SpO2 98%   BMI 28.65 kg/m²   Labs Reviewed   COMPREHENSIVE METABOLIC PANEL - Abnormal; Notable for the following components:       Result Value    Glucose 104 (*)     BUN 6 (*)     Sodium 123 (*)     Potassium 3.2 (*)     Chloride 85 (*)     All other components within normal limits    Narrative:     GFR Normal >60  Chronic Kidney Disease <60  Kidney Failure <15     CBC WITH AUTO DIFFERENTIAL - Abnormal; Notable for the following components:    Eosinophil % 0.2 (*)     All other components within normal limits   TROPONIN (IN-HOUSE) - Normal    Narrative:     Troponin T Reference Range:  <= 0.03 ng/mL-   Negative for AMI  >0.03 ng/mL-     Abnormal for myocardial necrosis.  Clinicians would have to utilize clinical acumen, EKG, Troponin and serial changes to determine if it is an Acute Myocardial Infarction or myocardial injury due to an underlying chronic condition.       Results may be falsely decreased if patient taking Biotin.     TSH - Normal   MAGNESIUM - Normal   RAINBOW DRAW    Narrative:     The following orders were created for panel order [place] Draw.  Procedure   "                             Abnormality         Status                     ---------                               -----------         ------                     Green Top (Gel)[[id number]]                                  Final result               Lavender Top[[id number]]                                     Final result               Gold Top - SST[[id number]]                                   Final result               Light Blue Top[[id number]]                                   Final result                 Please view results for these tests on the individual orders.   TROPONIN (IN-HOUSE)   CBC AND DIFFERENTIAL    Narrative:     The following orders were created for panel order CBC & Differential.  Procedure                               Abnormality         Status                     ---------                               -----------         ------                     CBC Auto Differential[[id number]]        Abnormal            Final result                 Please view results for these tests on the individual orders.   GREEN TOP   LAVENDER TOP   GOLD TOP - SST   LIGHT BLUE TOP     XR Chest 1 View    Result Date: 12/24/2022  Impression: No acute cardiopulmonary process. Electronically Signed: Cindy Moody MD  12/24/2022 10:07 PM EST  Workstation ID: [id number]                                         [facility]     Chart review:  Allergies reviewed.    Last echo done on 5/2/2022 shows EF of 65%, left ventricular diastolic function is consistent with grade 2 with high LAP pseudonormalization.    EKG: EKG reviewed by myself and interpreted by Dr. Meet Hoff shows sinus rhythm 81 bpm, no ST elevation apparent.  No previous to compare.    Imaging:    XR Chest 1 View   Final Result   Impression:   No acute cardiopulmonary process.      Electronically Signed: Cindy Moody MD     12/24/2022 10:07 PM EST     Workstation ID: [id number]          Labs: Sodium of 123, potassium of 3.2 otherwise negative troponin, TSH normal, magnesium  "normal and unremarkable CBC.  Vitals:  BP (!) 189/84 (BP Location: Left arm, Patient Position: Sitting)   Pulse 88   Temp 97.6 °F (36.4 °C) (Oral)   Resp 16   Ht 165.1 cm (65\")   Wt 78.1 kg (172 lb 2.9 oz)   SpO2 98%   BMI 28.65 kg/m²     Medications given:    Medications   sodium chloride 0.9 % flush 10 mL (has no administration in time range)   potassium chloride (K-DUR,KLOR-CON) CR tablet 40 mEq (has no administration in time range)   labetalol (NORMODYNE,TRANDATE) injection 20 mg (20 mg Intravenous Given 12/24/22 2154)       Procedures:  Not indicated  MDM: Patient is a 67-year-old female comes in complaining of of elevated blood pressure throughout the day and mild headache.  IV established.  Sodium of 123, potassium of 3.2 otherwise negative troponin, TSH normal, magnesium normal and unremarkable CBC.  EKG shows no acute findings.  Potassium replacement given here in the ER.  Patient was given labetalol for blood pressure and upon recheck was 160s over 90s.  Patient has documented history of hyponatremia in the past and patient states that her last lab work reflected the same which is likely hydrochlorothiazide induced as patient took more of this today as well and likely exacerbated her electrolyte abnormalities.  Patient was instructed to increase her dose of atenolol for her blood pressure control the next few days until she is able to follow-up with her primary care provider for blood pressure recheck and electrolyte retracted.  I stressed with both patient and family at bedside to have blood work and electrolytes rechecked and blood pressure recheck later this week and voiced understanding.  Patient states that she has been drinking plenty of water, Pedialyte and grape juice throughout the day and does not appear dehydrated on exam.  See full discharge instructions for further details.  Results and plan discussed with patient and is agreeable with plan. Case discussed with attending provider,  " Dewayne.    Final diagnoses:   Asymptomatic hypertension   Hyponatremia   Hypokalemia       ED Disposition  ED Disposition     ED Disposition   Discharge    Condition   Stable    Comment   --             [facility] EMERGENCY DEPARTMENT  [address] [address] [phone]  Go in 1 day  As needed, If symptoms worsen    David Man MD  [address] [facility] [place] [address]  [phone]    Call in 1 week  As needed         Medication List      Changed    atenolol 25 MG tablet  Commonly known as: TENORMIN  Take 2 tablets by mouth Daily for 7 days.  What changed:   · how much to take  · how to take this  · when to take this           Where to Get Your Medications      These medications were sent to [facility] - [place], [place] - [address] - [phone]  - [phone] FX  [address] [place] [address]    Phone: [phone]   · atenolol 25 MG tablet          Herminio Cuba PA  12/24/22 3906       Herminio Cuba PA  12/24/22 4926

## 2022-12-25 NOTE — DISCHARGE INSTRUCTIONS
You will need your blood pressure rechecked in 1 week's time as well as electrolytes rechecked on blood work including your potassium and sodium as these were low today.  Please take an extra dose of you atenolol to take 50 mg daily until follow up with Primary care provider.  Please continue to take your potassium replacement and can focus on potassium rich foods such as bananas as well.  Please come back to the ER if you have chest pain or shortness of breath or are confused as you will need reevaluation at time.

## 2022-12-27 LAB — QT INTERVAL: 391 MS

## 2023-01-05 ENCOUNTER — OFFICE (OUTPATIENT)
Dept: URBAN - METROPOLITAN AREA CLINIC 64 | Facility: CLINIC | Age: 68
End: 2023-01-05

## 2023-01-05 VITALS
WEIGHT: 169 LBS | SYSTOLIC BLOOD PRESSURE: 127 MMHG | DIASTOLIC BLOOD PRESSURE: 72 MMHG | HEIGHT: 65 IN | HEART RATE: 63 BPM

## 2023-01-05 DIAGNOSIS — R19.7 DIARRHEA, UNSPECIFIED: ICD-10-CM

## 2023-01-05 DIAGNOSIS — Z86.19 PERSONAL HISTORY OF OTHER INFECTIOUS AND PARASITIC DISEASES: ICD-10-CM

## 2023-01-05 PROCEDURE — 99213 OFFICE O/P EST LOW 20 MIN: CPT | Performed by: NURSE PRACTITIONER

## 2023-01-05 RX ORDER — SACCHAROMYCES BOULARDII 50 MG
500 CAPSULE ORAL
Qty: 60 | Refills: 5 | Status: COMPLETED
Start: 2023-01-05 | End: 2023-03-06

## 2023-01-09 ENCOUNTER — HOSPITAL ENCOUNTER (OUTPATIENT)
Dept: CARDIOLOGY | Facility: HOSPITAL | Age: 68
Discharge: HOME | End: 2023-01-09
Admitting: INTERNAL MEDICINE
Payer: MEDICARE

## 2023-01-09 VITALS
DIASTOLIC BLOOD PRESSURE: 58 MMHG | HEIGHT: 65 IN | SYSTOLIC BLOOD PRESSURE: 158 MMHG | BODY MASS INDEX: 28.69 KG/M2 | WEIGHT: 172.18 LBS

## 2023-01-09 DIAGNOSIS — R06.00 DYSPNEA: ICD-10-CM

## 2023-01-09 LAB
BH CV ECHO MEAS - ACS: 1.56 CM
BH CV ECHO MEAS - AI P1/2T: 620.1 MSEC
BH CV ECHO MEAS - AO MAX PG: 8.1 MMHG
BH CV ECHO MEAS - AO MEAN PG: 4.4 MMHG
BH CV ECHO MEAS - AO ROOT DIAM: 2.5 CM
BH CV ECHO MEAS - AO V2 MAX: 142.6 CM/SEC
BH CV ECHO MEAS - AO V2 VTI: 36.5 CM
BH CV ECHO MEAS - AVA(I,D): 2.32 CM2
BH CV ECHO MEAS - EDV(CUBED): 82.6 ML
BH CV ECHO MEAS - EDV(MOD-SP4): 59.3 ML
BH CV ECHO MEAS - EF(MOD-BP): 67 %
BH CV ECHO MEAS - EF(MOD-SP4): 59 %
BH CV ECHO MEAS - ESV(CUBED): 20.9 ML
BH CV ECHO MEAS - ESV(MOD-SP4): 24.3 ML
BH CV ECHO MEAS - FS: 36.7 %
BH CV ECHO MEAS - IVS/LVPW: 1.03 CM
BH CV ECHO MEAS - IVSD: 1.02 CM
BH CV ECHO MEAS - LA DIMENSION: 3.7 CM
BH CV ECHO MEAS - LV DIASTOLIC VOL/BSA (35-75): 32 CM2
BH CV ECHO MEAS - LV MASS(C)D: 147 GRAMS
BH CV ECHO MEAS - LV MAX PG: 4.4 MMHG
BH CV ECHO MEAS - LV MEAN PG: 2.26 MMHG
BH CV ECHO MEAS - LV SYSTOLIC VOL/BSA (12-30): 13.1 CM2
BH CV ECHO MEAS - LV V1 MAX: 105 CM/SEC
BH CV ECHO MEAS - LV V1 VTI: 28.9 CM
BH CV ECHO MEAS - LVIDD: 4.4 CM
BH CV ECHO MEAS - LVIDS: 2.8 CM
BH CV ECHO MEAS - LVOT AREA: 2.9 CM2
BH CV ECHO MEAS - LVOT DIAM: 1.93 CM
BH CV ECHO MEAS - LVPWD: 0.99 CM
BH CV ECHO MEAS - MR MAX PG: 125.5 MMHG
BH CV ECHO MEAS - MR MAX VEL: 559.8 CM/SEC
BH CV ECHO MEAS - MV A MAX VEL: 88.9 CM/SEC
BH CV ECHO MEAS - MV DEC SLOPE: 516.1 CM/SEC2
BH CV ECHO MEAS - MV DEC TIME: 0.2 MSEC
BH CV ECHO MEAS - MV E MAX VEL: 102.5 CM/SEC
BH CV ECHO MEAS - MV E/A: 1.15
BH CV ECHO MEAS - MV MAX PG: 4.4 MMHG
BH CV ECHO MEAS - MV MEAN PG: 1.35 MMHG
BH CV ECHO MEAS - MV V2 VTI: 28.2 CM
BH CV ECHO MEAS - MVA(VTI): 3 CM2
BH CV ECHO MEAS - PA ACC TIME: 0.17 SEC
BH CV ECHO MEAS - PA PR(ACCEL): 1.93 MMHG
BH CV ECHO MEAS - PA V2 MAX: 90.6 CM/SEC
BH CV ECHO MEAS - PI END-D VEL: 65.2 CM/SEC
BH CV ECHO MEAS - PULM A REVS DUR: 0.12 SEC
BH CV ECHO MEAS - PULM A REVS VEL: 29.6 CM/SEC
BH CV ECHO MEAS - PULM DIAS VEL: 49.2 CM/SEC
BH CV ECHO MEAS - PULM S/D: 1.81
BH CV ECHO MEAS - PULM SYS VEL: 88.9 CM/SEC
BH CV ECHO MEAS - RAP SYSTOLE: 10 MMHG
BH CV ECHO MEAS - RVSP: 46.9 MMHG
BH CV ECHO MEAS - SI(MOD-SP4): 18.9 ML/M2
BH CV ECHO MEAS - SV(LVOT): 84.5 ML
BH CV ECHO MEAS - SV(MOD-SP4): 35 ML
BH CV ECHO MEAS - TAPSE (>1.6): 2 CM
BH CV ECHO MEAS - TR MAX PG: 36.9 MMHG
BH CV ECHO MEAS - TR MAX VEL: 303.6 CM/SEC
BH CV XLRA - RV BASE: 3.5 CM
BH CV XLRA - RV MID: 1.8 CM
MAXIMAL PREDICTED HEART RATE: 152 BPM
STRESS TARGET HR: 129 BPM

## 2023-01-09 PROCEDURE — 93306 TTE W/DOPPLER COMPLETE: CPT | Performed by: INTERNAL MEDICINE

## 2023-01-09 PROCEDURE — 93306 TTE W/DOPPLER COMPLETE: CPT

## 2023-01-17 ENCOUNTER — OFFICE (OUTPATIENT)
Dept: URBAN - METROPOLITAN AREA LAB 2 | Facility: LAB | Age: 68
End: 2023-01-17

## 2023-01-17 DIAGNOSIS — A02.9 SALMONELLA INFECTION, UNSPECIFIED: ICD-10-CM

## 2023-01-17 DIAGNOSIS — A08.11 ACUTE GASTROENTEROPATHY DUE TO NORWALK AGENT: ICD-10-CM

## 2023-01-17 DIAGNOSIS — R19.7 DIARRHEA, UNSPECIFIED: ICD-10-CM

## 2023-01-17 PROCEDURE — 87324 CLOSTRIDIUM AG IA: CPT | Mod: 59 | Performed by: NURSE PRACTITIONER

## 2023-01-17 PROCEDURE — 87505 NFCT AGENT DETECTION GI: CPT | Performed by: NURSE PRACTITIONER

## 2023-01-17 PROCEDURE — 87449 NOS EACH ORGANISM AG IA: CPT | Performed by: NURSE PRACTITIONER

## 2023-02-06 ENCOUNTER — TELEHEALTH PROVIDED IN PATIENT'S HOME (OUTPATIENT)
Dept: URBAN - METROPOLITAN AREA TELEHEALTH 4 | Facility: TELEHEALTH | Age: 68
End: 2023-02-06

## 2023-02-06 VITALS — HEIGHT: 65 IN

## 2023-02-06 DIAGNOSIS — Z86.19 PERSONAL HISTORY OF OTHER INFECTIOUS AND PARASITIC DISEASES: ICD-10-CM

## 2023-02-06 DIAGNOSIS — R19.7 DIARRHEA, UNSPECIFIED: ICD-10-CM

## 2023-02-06 DIAGNOSIS — Z79.82 LONG TERM (CURRENT) USE OF ASPIRIN: ICD-10-CM

## 2023-02-06 PROCEDURE — 99214 OFFICE O/P EST MOD 30 MIN: CPT | Performed by: INTERNAL MEDICINE

## 2023-02-06 RX ORDER — COLESEVELAM HYDROCHLORIDE 625 MG/1
3750 TABLET, COATED ORAL
Qty: 180 | Refills: 0 | Status: COMPLETED
Start: 2023-02-06 | End: 2023-03-06

## 2023-02-06 RX ORDER — SAME BUTANEDISULFONATE/BETAINE 400-600 MG
500 POWDER IN PACKET (EA) ORAL
Qty: 60 | Refills: 5 | Status: COMPLETED
Start: 2023-02-06 | End: 2023-03-06

## 2023-02-06 RX ORDER — VANCOMYCIN HYDROCHLORIDE 125 MG/1
500 CAPSULE ORAL
Qty: 56 | Refills: 0 | Status: COMPLETED
Start: 2023-02-06 | End: 2023-03-06

## 2023-03-06 ENCOUNTER — OFFICE (OUTPATIENT)
Dept: URBAN - METROPOLITAN AREA CLINIC 64 | Facility: CLINIC | Age: 68
End: 2023-03-06

## 2023-03-06 VITALS — WEIGHT: 169 LBS | HEIGHT: 65 IN

## 2023-03-06 DIAGNOSIS — Z86.19 PERSONAL HISTORY OF OTHER INFECTIOUS AND PARASITIC DISEASES: ICD-10-CM

## 2023-03-06 DIAGNOSIS — R19.7 DIARRHEA, UNSPECIFIED: ICD-10-CM

## 2023-03-06 PROCEDURE — 99212 OFFICE O/P EST SF 10 MIN: CPT | Performed by: NURSE PRACTITIONER

## 2023-03-20 ENCOUNTER — OFFICE (OUTPATIENT)
Dept: URBAN - METROPOLITAN AREA CLINIC 64 | Facility: CLINIC | Age: 68
End: 2023-03-20

## 2023-03-20 VITALS
DIASTOLIC BLOOD PRESSURE: 79 MMHG | HEIGHT: 65 IN | SYSTOLIC BLOOD PRESSURE: 153 MMHG | WEIGHT: 168 LBS | HEART RATE: 67 BPM

## 2023-03-20 DIAGNOSIS — R19.5 OTHER FECAL ABNORMALITIES: ICD-10-CM

## 2023-03-20 DIAGNOSIS — R22.1 LOCALIZED SWELLING, MASS AND LUMP, NECK: ICD-10-CM

## 2023-03-20 DIAGNOSIS — R19.7 DIARRHEA, UNSPECIFIED: ICD-10-CM

## 2023-03-20 PROCEDURE — 99212 OFFICE O/P EST SF 10 MIN: CPT | Performed by: NURSE PRACTITIONER

## 2023-04-11 ENCOUNTER — OFFICE (OUTPATIENT)
Dept: URBAN - METROPOLITAN AREA CLINIC 64 | Facility: CLINIC | Age: 68
End: 2023-04-11

## 2023-04-11 VITALS
WEIGHT: 168 LBS | HEART RATE: 65 BPM | DIASTOLIC BLOOD PRESSURE: 77 MMHG | HEIGHT: 65 IN | SYSTOLIC BLOOD PRESSURE: 142 MMHG

## 2023-04-11 DIAGNOSIS — R19.5 OTHER FECAL ABNORMALITIES: ICD-10-CM

## 2023-04-11 PROCEDURE — 99213 OFFICE O/P EST LOW 20 MIN: CPT | Performed by: NURSE PRACTITIONER

## 2023-04-13 ENCOUNTER — LAB (OUTPATIENT)
Dept: LAB | Facility: HOSPITAL | Age: 68
End: 2023-04-13
Payer: MEDICARE

## 2023-04-13 ENCOUNTER — TRANSCRIBE ORDERS (OUTPATIENT)
Dept: ADMINISTRATIVE | Facility: HOSPITAL | Age: 68
End: 2023-04-13
Payer: MEDICARE

## 2023-04-13 DIAGNOSIS — R19.5 NONSPECIFIC ABNORMAL FINDING IN STOOL CONTENTS: Primary | ICD-10-CM

## 2023-04-13 DIAGNOSIS — R19.5 NONSPECIFIC ABNORMAL FINDING IN STOOL CONTENTS: ICD-10-CM

## 2023-04-13 LAB

## 2023-04-13 PROCEDURE — 87507 IADNA-DNA/RNA PROBE TQ 12-25: CPT

## 2023-05-22 ENCOUNTER — TELEPHONE (OUTPATIENT)
Dept: CARDIOLOGY | Facility: CLINIC | Age: 68
End: 2023-05-22

## 2023-05-22 NOTE — TELEPHONE ENCOUNTER
Caller: Emir Kim    Relationship: Self    Best call back number:[phone]    What is the best time to reach you: ANY    Who are you requesting to speak with (clinical staff, provider,  specific staff member): ANY    What was the call regarding: PATIENT STATES THAT WE NEED TO SEND A REQUEST FOR RECORDS FORM OVER THE DR ORELLANA'S OFFICE TO RECEIVE HER PREVIOUS RECORDS.  THE OFFICE NUMBER IS: [phone].   PATIENT DID NOT HAVE A FAX NUMBER FOR THAT OFFICE.    Do you require a callback: YES

## 2023-06-12 ENCOUNTER — TELEHEALTH PROVIDED IN PATIENT'S HOME (OUTPATIENT)
Dept: URBAN - METROPOLITAN AREA TELEHEALTH 4 | Facility: TELEHEALTH | Age: 68
End: 2023-06-12

## 2023-06-12 VITALS — HEIGHT: 65 IN

## 2023-06-12 DIAGNOSIS — R19.5 OTHER FECAL ABNORMALITIES: ICD-10-CM

## 2023-06-12 PROCEDURE — 99441: CPT | Performed by: NURSE PRACTITIONER

## 2023-07-10 PROBLEM — H53.10 SUBJECTIVE VISUAL DISTURBANCE: Status: ACTIVE | Noted: 2019-01-04

## 2023-07-10 PROBLEM — I10 BENIGN ESSENTIAL HYPERTENSION: Status: ACTIVE | Noted: 2023-07-10

## 2023-07-10 PROBLEM — A04.72 ENTEROCOLITIS DUE TO CLOSTRIDIUM DIFFICILE: Status: ACTIVE | Noted: 2023-07-10

## 2023-07-10 PROBLEM — A60.00 GENITAL HERPES SIMPLEX: Status: ACTIVE | Noted: 2023-07-10

## 2023-07-10 PROBLEM — B30.9 VIRAL CONJUNCTIVITIS: Status: ACTIVE | Noted: 2017-05-22

## 2023-07-10 PROBLEM — F41.1 ANXIETY STATE: Status: ACTIVE | Noted: 2023-07-10

## 2023-07-10 PROBLEM — Z86.19 HISTORY OF CLOSTRIDIOIDES DIFFICILE INFECTION: Status: ACTIVE | Noted: 2023-07-10

## 2023-07-10 PROBLEM — M19.90 OSTEOARTHRITIS: Status: ACTIVE | Noted: 2023-07-10

## 2023-07-10 PROBLEM — D31.30 NEVUS OF CHOROID: Status: ACTIVE | Noted: 2019-01-04

## 2023-07-10 PROBLEM — H69.80 DYSFUNCTION OF EUSTACHIAN TUBE: Status: ACTIVE | Noted: 2023-07-10

## 2023-07-10 PROBLEM — A02.9 SALMONELLA: Status: ACTIVE | Noted: 2023-07-10

## 2023-07-10 PROBLEM — Q43.3 CONGENITAL MALROTATION OF INTESTINE: Status: ACTIVE | Noted: 2023-07-10

## 2023-07-10 PROBLEM — I49.1 SUPRAVENTRICULAR PREMATURE BEATS: Status: ACTIVE | Noted: 2023-07-10

## 2023-07-10 PROBLEM — H52.4 PRESBYOPIA: Status: ACTIVE | Noted: 2017-01-31

## 2023-07-10 PROBLEM — N95.1 MENOPAUSAL SYNDROME: Status: ACTIVE | Noted: 2023-07-10

## 2023-07-10 PROBLEM — H69.90 DYSFUNCTION OF EUSTACHIAN TUBE: Status: ACTIVE | Noted: 2023-07-10

## 2023-07-10 PROBLEM — H43.819 POSTERIOR VITREOUS DETACHMENT: Status: ACTIVE | Noted: 2019-01-04

## 2023-07-10 PROBLEM — M50.30 DEGENERATION OF INTERVERTEBRAL DISC OF CERVICAL REGION: Status: ACTIVE | Noted: 2023-07-10

## 2023-07-17 ENCOUNTER — TELEPHONE (OUTPATIENT)
Dept: CARDIOLOGY | Facility: CLINIC | Age: 68
End: 2023-07-17

## 2023-07-17 NOTE — TELEPHONE ENCOUNTER
Caller: Emir Kim    Relationship: Self    Best call back number: [phone]    What is the best time to reach you: ANYTIME    Who are you requesting to speak with (clinical staff, provider,  specific staff member): ANYONE    What was the call regarding: PT IS CALLING TO LET DR. MILTON KNOW THAT SHE HAD ALLERGY TESTING AND WANTED TO LET HIM KNOW SHE'S NOT ALLERGIC TO PENICILLIN. SHE SAID SHE CAN BRING IN THE PAPERWORK AT HER NEXT APPT.    Is it okay if the provider responds through MyChart: YES

## 2023-08-09 ENCOUNTER — LAB REQUISITION (OUTPATIENT)
Dept: LAB | Facility: HOSPITAL | Age: 68
End: 2023-08-09
Payer: MEDICARE

## 2023-08-09 DIAGNOSIS — Z00.00 ENCOUNTER FOR GENERAL ADULT MEDICAL EXAMINATION WITHOUT ABNORMAL FINDINGS: ICD-10-CM

## 2023-08-09 PROCEDURE — 88305 TISSUE EXAM BY PATHOLOGIST: CPT | Performed by: RADIOLOGY

## 2023-08-10 LAB
LAB AP CASE REPORT: NORMAL
LAB AP DIAGNOSIS COMMENT: NORMAL
PATH REPORT.FINAL DX SPEC: NORMAL
PATH REPORT.GROSS SPEC: NORMAL

## 2023-11-17 ENCOUNTER — TELEPHONE (OUTPATIENT)
Dept: CARDIOLOGY | Facility: CLINIC | Age: 68
End: 2023-11-17

## 2023-11-17 NOTE — TELEPHONE ENCOUNTER
Caller: Emir Kim    Relationship: Self    Best call back number: [phone]    What orders are you requesting (i.e. lab or imaging): ECHO    In what timeframe would the patient need to come in: WHEN AVAILABLE    Where will you receive your lab/imaging services: DR. MILTON OFFICE    Additional notes: PT IS CALLING TO SEE IF DR. MILTON WANTS HER TO DO AN ECHO BEFORE HER APPT ON 1/8/23. SHE SAID SHE WOULD ALSO LIKE TO HAVE ONE COMPLETED.

## 2023-12-08 ENCOUNTER — TRANSCRIBE ORDERS (OUTPATIENT)
Dept: ADMINISTRATIVE | Facility: HOSPITAL | Age: 68
End: 2023-12-08
Payer: MEDICARE

## 2023-12-08 DIAGNOSIS — Q43.3 MALROTATION OF INTESTINE: ICD-10-CM

## 2023-12-08 DIAGNOSIS — R19.7 DIARRHEA, UNSPECIFIED TYPE: Primary | ICD-10-CM

## 2024-01-05 NOTE — PROGRESS NOTES
Date of Office Visit: 2024  Encounter Provider: Dr. Ed Lock  Place of Service: [facility] CARDIOLOGY [place]  Patient Name: Emir Kim  :1955  David Man MD    Chief Complaint   Patient presents with    Palpitations    Hypertension    Follow-up     History of Present Illness    I am pleased to see Mrs. Kim in my office today as a follow-up.    As you know, patient is 68-year-old white female whose past medical history significant for hypertension, valvular heart disease who came today for follow-up.    Patient has history of valvular heart disease and was noted to have mitral regurgitation and aortic regurgitation.  Patient has been monitored for that.  In 2023, patient underwent echocardiogram which showed normal left ventricular size and function with a EF of 55 to 60%.  Patient has mild aortic regurgitation and tricuspid and mitral regurgitation.    Patient does not have previous history of CAD, PCI or MI.  She is not diabetic.  Patient does not smoke or abuse alcohol.    Patient came today for follow-up.  From a cardiac standpoint patient is stable.  She denies any symptom of chest pain or tightness or heaviness.  Patient does complain of intolerance to carbohydrates and simple sugars and has abdominal pain.  She recently had colonoscopy and MR colonoscopy and the results are being awaited.  Patient otherwise is stable.  Patient denies any leg edema.    She brought the blood pressure logbook and all blood pressure readings are within desirable range.  Patient denies any new symptoms.  At this stage I will continue current treatment.  I would proceed with echocardiogram in .  Patient may be needing stress test at that time      Past Medical History:   Diagnosis Date    Cataract     Environmental and seasonal allergies     Floppy mitral valve     GERD (gastroesophageal reflux disease)     Hypertension     Hypothyroidism (acquired)     Migraine          Past  Surgical History:   Procedure Laterality Date    BREAST IMPLANT SURGERY      TONSILECTOMY, ADENOIDECTOMY, BILATERAL MYRINGOTOMY AND TUBES             Current Outpatient Medications:     aspirin 81 MG chewable tablet, Chew 1 tablet Daily., Disp: , Rfl:     atenolol (TENORMIN) 25 MG tablet, Take 1 tablet by mouth Daily., Disp: , Rfl:     fluticasone (FLONASE) 50 MCG/ACT nasal spray, , Disp: , Rfl:     furosemide (LASIX) 20 MG tablet, Take 1 tablet by mouth Daily., Disp: , Rfl:     Kava, Piper methysticum, (KAVA KAVA PO), Take  by mouth., Disp: , Rfl:     latanoprost (XALATAN) 0.005 % ophthalmic solution, , Disp: , Rfl:     levothyroxine (SYNTHROID, LEVOTHROID) 25 MCG tablet, Take 1 tablet by mouth Every Morning., Disp: , Rfl:     magnesium oxide (MAG-OX) 400 MG tablet, Take 1 tablet by mouth Daily., Disp: , Rfl:     multivitamin with minerals (MULTIVITAMIN ADULT PO), Take 1 tablet by mouth Daily., Disp: , Rfl:     Potassium Bicarb-Citric Acid (Effer-K) 20 MEQ effervescent tablet, Take 20 mEq by mouth Daily., Disp: , Rfl:       Social History     Socioeconomic History    Marital status:    Tobacco Use    Smoking status: Never    Smokeless tobacco: Never   Vaping Use    Vaping Use: Never used   Substance and Sexual Activity    Alcohol use: Not Currently    Drug use: Not Currently    Sexual activity: Defer         Review of Systems   Constitutional: Negative for chills and fever.   HENT:  Negative for ear discharge and nosebleeds.    Eyes:  Negative for discharge and redness.   Cardiovascular:  Negative for chest pain, orthopnea, palpitations, paroxysmal nocturnal dyspnea and syncope.   Respiratory:  Negative for cough, shortness of breath and wheezing.    Endocrine: Negative for heat intolerance.   Skin:  Negative for rash.   Musculoskeletal:  Negative for arthritis and myalgias.   Gastrointestinal:  Positive for abdominal pain. Negative for melena, nausea and vomiting.   Genitourinary:  Negative for dysuria and  "hematuria.   Neurological:  Negative for dizziness, light-headedness, numbness and tremors.   Psychiatric/Behavioral:  Negative for depression. The patient is not nervous/anxious.        Procedures    Procedures    No orders to display           Objective:    /79 (BP Location: Right arm, Patient Position: Sitting, Cuff Size: Large Adult)   Pulse 70   Resp 18   Ht 165.1 cm (65\")   Wt 65.3 kg (144 lb)   SpO2 99%   BMI 23.96 kg/m²         Constitutional:       Appearance: Well-developed.   Eyes:      General: No scleral icterus.        Right eye: No discharge.   HENT:      Head: Normocephalic and atraumatic.   Neck:      Thyroid: No thyromegaly.      Lymphadenopathy: No cervical adenopathy.   Pulmonary:      Effort: Pulmonary effort is normal. No respiratory distress.      Breath sounds: Normal breath sounds. No wheezing. No rales.   Cardiovascular:      Normal rate. Regular rhythm.      No gallop.    Edema:     Peripheral edema absent.   Abdominal:      Tenderness: There is no abdominal tenderness.   Skin:     Findings: No erythema or rash.   Neurological:      Mental Status: Alert and oriented to person, place, and time.             Assessment:       Diagnosis Plan   1. Aortic valve insufficiency, etiology of cardiac valve disease unspecified        2. Nonrheumatic mitral valve regurgitation        3. Benign essential hypertension                 Plan:       MDM:    1.  Hypertension:    Patient blood pressure is slightly elevated in office but she has element of white coat hypertension.  All blood pressure readings at home are within desirable range I will continue current treatment    2.  Mitral regurgitation:    Patient has mild mitral regurgitation continue to observe    3.  Aortic insufficiency:    Patient has mild aortic insufficiency.  Aggressive blood pressure control recommended continue to observe.  Repeat echocardiogram in 1 to 2 years  "

## 2024-01-08 ENCOUNTER — OFFICE VISIT (OUTPATIENT)
Dept: CARDIOLOGY | Facility: CLINIC | Age: 69
End: 2024-01-08
Payer: MEDICARE

## 2024-01-08 VITALS
SYSTOLIC BLOOD PRESSURE: 129 MMHG | HEART RATE: 70 BPM | BODY MASS INDEX: 23.99 KG/M2 | RESPIRATION RATE: 18 BRPM | HEIGHT: 65 IN | WEIGHT: 144 LBS | DIASTOLIC BLOOD PRESSURE: 70 MMHG | OXYGEN SATURATION: 99 %

## 2024-01-08 DIAGNOSIS — I10 BENIGN ESSENTIAL HYPERTENSION: ICD-10-CM

## 2024-01-08 DIAGNOSIS — I35.1 AORTIC VALVE INSUFFICIENCY, ETIOLOGY OF CARDIAC VALVE DISEASE UNSPECIFIED: Primary | ICD-10-CM

## 2024-01-08 DIAGNOSIS — I34.0 NONRHEUMATIC MITRAL VALVE REGURGITATION: ICD-10-CM

## 2024-01-08 PROCEDURE — 3074F SYST BP LT 130 MM HG: CPT | Performed by: INTERNAL MEDICINE

## 2024-01-08 PROCEDURE — 3078F DIAST BP <80 MM HG: CPT | Performed by: INTERNAL MEDICINE

## 2024-01-08 PROCEDURE — 99214 OFFICE O/P EST MOD 30 MIN: CPT | Performed by: INTERNAL MEDICINE

## 2024-01-08 PROCEDURE — 1159F MED LIST DOCD IN RCRD: CPT | Performed by: INTERNAL MEDICINE

## 2024-01-08 PROCEDURE — 1160F RVW MEDS BY RX/DR IN RCRD: CPT | Performed by: INTERNAL MEDICINE

## 2024-01-08 RX ORDER — MAGNESIUM OXIDE 400 MG/1
400 TABLET ORAL DAILY
COMMUNITY

## 2024-01-08 RX ORDER — MULTIPLE VITAMINS W/ MINERALS TAB 9MG-400MCG
1 TAB ORAL DAILY
COMMUNITY

## 2024-01-24 ENCOUNTER — OFFICE (OUTPATIENT)
Dept: RURAL CLINIC 3 | Facility: CLINIC | Age: 69
End: 2024-01-24

## 2024-01-24 VITALS
HEART RATE: 72 BPM | DIASTOLIC BLOOD PRESSURE: 67 MMHG | WEIGHT: 137 LBS | HEIGHT: 65 IN | SYSTOLIC BLOOD PRESSURE: 131 MMHG

## 2024-01-24 DIAGNOSIS — Q43.3 CONGENITAL MALFORMATIONS OF INTESTINAL FIXATION: ICD-10-CM

## 2024-01-24 DIAGNOSIS — L29.3 ANOGENITAL PRURITUS, UNSPECIFIED: ICD-10-CM

## 2024-01-24 PROCEDURE — 99214 OFFICE O/P EST MOD 30 MIN: CPT | Performed by: INTERNAL MEDICINE

## 2024-02-12 ENCOUNTER — TELEPHONE (OUTPATIENT)
Dept: CARDIOLOGY | Facility: CLINIC | Age: 69
End: 2024-02-12

## 2024-02-12 NOTE — TELEPHONE ENCOUNTER
REQUEST FOR CARDIAC CLEARANCE    Caller name: Emir Kim     Phone Number: [phone]     Surgeon's name: DR GARNER    Type of planned surgery: BREAST SURGERY    Date of planned surgery: 4/9/24    Type of anesthesia: GENERAL    Have you been experiencing chest pain or shortness of breath? NO    Is your doctor requesting for you to stop any of your medications prior to your surgery? ASPRIN    Where should we fax the clearance to? [phone]      PROVIDER FAXED CLEARANCE END OF JAN

## 2024-04-08 ENCOUNTER — TELEPHONE (OUTPATIENT)
Dept: CARDIOLOGY | Facility: CLINIC | Age: 69
End: 2024-04-08

## 2024-11-14 ENCOUNTER — TELEPHONE (OUTPATIENT)
Dept: CARDIOLOGY | Facility: CLINIC | Age: 69
End: 2024-11-14

## 2024-11-14 NOTE — TELEPHONE ENCOUNTER
"PATIENT SAID THAT DR MILTON STATED THE LAST TIME THAT WE SAW HER THAT SHE COULD HAVE AN ECHO DONE BEFORE HER NEXT APPT. WE HAVE RESCHEDULED THAT \"NEXT APPT\" 2X NOW. SHE IS BEING SEEN IN [place] ON 2/28/25. SHE WOULD LIKE TO HAVE THE ECHO DONE BEFORE THEN AS SHE HAS 3 LEAKING HEART VALVES. IF SO, PLEASE PUT IN THE ORDER SO THAT ALBERTO CAN SCHEDULE IT. THANK YOU!     ALBERTO, SHE WOULD LIKE TO HAVE THE ECHO IN [place].  "

## 2024-11-14 NOTE — TELEPHONE ENCOUNTER
Caller: Emir Kim    Relationship to patient: Self    Best call back number: [phone]     Chief complaint: PT MENTIONED LEAKING VALVE    Type of visit: FU    Requested date: AUGUST 2025     Additional notes:PT REQUESTING TO GO AHEAD AND SET UP ANOTHER APPT IN AUG 2025. PT IS SEEING DR MILTON IN FEB THAT HAD TO BE RESCHEDULED, BUT SHE STATED SHE'S HAD TO RESCHEDULE THIS APPT TWICE NOW. SHE DECLINED WAITLIST AND SEEING A NP BUT IF ANYTHING SOONER COMES OPEN FOR [facility] PLS CALL PT. PT ALSO IS ASKING ABOUT AN ECHO, IF SHE NEEDS TO COMPLETE ONE PLS ALSO CALL AND ADVISE.

## 2025-02-27 NOTE — PROGRESS NOTES
Date of Office Visit: 2025  Encounter Provider: Dr. Ed Lock  Place of Service: [facility] CARDIOLOGY [place]  Patient Name: Emir Kim  :1955  David Man MD    Chief Complaint   Patient presents with    Palpitations    Hypertension    Follow-up     History of Present Illness    I am pleased to see Mrs. Kim in my office today as a follow-up.    As you know, patient is 69-year-old white female whose past medical history significant for hypertension, valvular heart disease who came today for follow-up.    Patient has history of valvular heart disease and was noted to have mitral regurgitation and aortic regurgitation.  Patient has been monitored for that.  In 2023, patient underwent echocardiogram which showed normal left ventricular size and function with a EF of 55 to 60%.  Patient has mild aortic regurgitation and tricuspid and mitral regurgitation.    Patient does not have previous history of CAD, PCI or MI.  She is not diabetic.  Patient does not smoke or abuse alcohol.    Patient came today for follow-up.  Patient complain of palpitation described as skipping of the heartbeat.  Patient does not have chest pain.  She does complain of shortness of breath on heavy activities.  Patient denies any orthopnea, PND, syncope or presyncope.    EKG showed sinus bradycardia    Patient has not had ischemic evaluation for a while.  I would recommend to proceed with stress test.  Patient would need echocardiogram to follow-up on aortic regurgitation.      Past Medical History:   Diagnosis Date    Cataract     Environmental and seasonal allergies     Floppy mitral valve     GERD (gastroesophageal reflux disease)     Hypertension     Hypothyroidism (acquired)     Migraine          Past Surgical History:   Procedure Laterality Date    BREAST IMPLANT SURGERY      TONSILECTOMY, ADENOIDECTOMY, BILATERAL MYRINGOTOMY AND TUBES             Current Outpatient Medications:     aspirin 81  MG chewable tablet, Chew 1 tablet Daily., Disp: , Rfl:     atenolol (TENORMIN) 25 MG tablet, Take 1 tablet by mouth Daily., Disp: , Rfl:     fluticasone (FLONASE) 50 MCG/ACT nasal spray, , Disp: , Rfl:     furosemide (LASIX) 20 MG tablet, Take 1 tablet by mouth Daily., Disp: , Rfl:     latanoprost (XALATAN) 0.005 % ophthalmic solution, , Disp: , Rfl:     levothyroxine (SYNTHROID, LEVOTHROID) 25 MCG tablet, Take 1 tablet by mouth Every Morning., Disp: , Rfl:     Potassium Bicarb-Citric Acid (Effer-K) 20 MEQ effervescent tablet, Take 20 mEq by mouth Daily., Disp: , Rfl:       Social History     Socioeconomic History    Marital status:    Tobacco Use    Smoking status: Never    Smokeless tobacco: Never   Vaping Use    Vaping status: Never Used   Substance and Sexual Activity    Alcohol use: Not Currently    Drug use: Not Currently    Sexual activity: Defer         Review of Systems   Constitutional: Negative for chills and fever.   HENT:  Negative for ear discharge and nosebleeds.    Eyes:  Negative for discharge and redness.   Cardiovascular:  Positive for palpitations. Negative for chest pain, orthopnea, paroxysmal nocturnal dyspnea and syncope.   Respiratory:  Positive for shortness of breath. Negative for cough and wheezing.    Endocrine: Negative for heat intolerance.   Skin:  Negative for rash.   Musculoskeletal:  Negative for arthritis and myalgias.   Gastrointestinal:  Negative for abdominal pain, melena, nausea and vomiting.   Genitourinary:  Negative for dysuria and hematuria.   Neurological:  Negative for dizziness, light-headedness, numbness and tremors.   Psychiatric/Behavioral:  Negative for depression. The patient is not nervous/anxious.        Procedures      ECG 12 Lead    Date/Time: 2/28/2025 9:04 AM  Performed by: Ed Lock MD    Authorized by: Ed Lock MD  Comparison: compared with previous ECG   Similar to previous ECG  Rhythm: sinus rhythm and sinus bradycardia    Clinical  "impression: abnormal EKG          ECG 12 Lead    (Results Pending)           Objective:    /80 (BP Location: Right arm, Patient Position: Sitting, Cuff Size: Large Adult)   Pulse 57   Resp 16   Ht 165 cm (64.96\")   Wt 65.8 kg (145 lb)   SpO2 99%   BMI 24.16 kg/m²         Constitutional:       Appearance: Well-developed.   Eyes:      General: No scleral icterus.        Right eye: No discharge.   HENT:      Head: Normocephalic and atraumatic.   Neck:      Thyroid: No thyromegaly.      Lymphadenopathy: No cervical adenopathy.   Pulmonary:      Effort: Pulmonary effort is normal. No respiratory distress.      Breath sounds: Normal breath sounds. No wheezing. No rales.   Cardiovascular:      Normal rate. Regular rhythm.      No gallop.    Edema:     Peripheral edema absent.   Abdominal:      Tenderness: There is no abdominal tenderness.   Skin:     Findings: No erythema or rash.   Neurological:      Mental Status: Alert and oriented to person, place, and time.             Assessment:       Diagnosis Plan   1. Supraventricular premature beats  ECG 12 Lead    Adult Transthoracic Echo Complete W/ Cont if Necessary Per Protocol    Stress Test With Myocardial Perfusion One Day      2. Primary hypertension  ECG 12 Lead    Adult Transthoracic Echo Complete W/ Cont if Necessary Per Protocol    Stress Test With Myocardial Perfusion One Day      3. Benign essential hypertension  Adult Transthoracic Echo Complete W/ Cont if Necessary Per Protocol    Stress Test With Myocardial Perfusion One Day      4. Palpitations  Adult Transthoracic Echo Complete W/ Cont if Necessary Per Protocol    Stress Test With Myocardial Perfusion One Day      5. Shortness of breath  Adult Transthoracic Echo Complete W/ Cont if Necessary Per Protocol    Stress Test With Myocardial Perfusion One Day               Plan:       MDM:    1.  Hypertension:    Blood pressure is elevated but she believes her blood pressure at home are within desirable " range.  She has been checking at home.  I did not see the logbook.  At present I would advise the patient to check the blood pressure at home and bring the logbook    2.  Palpitation:    Patient is having palpitation previously documented to be PACs.  If patient continues to have that repeat MCOT.  I would recommend to proceed with stress test    3.  Shortness of breath:    I would recommend to proceed with stress test    4.  Valvular heart disease:    Repeat echocardiogram

## 2025-02-28 ENCOUNTER — OFFICE VISIT (OUTPATIENT)
Dept: CARDIOLOGY | Facility: CLINIC | Age: 70
End: 2025-02-28
Payer: MEDICARE

## 2025-02-28 VITALS
RESPIRATION RATE: 16 BRPM | BODY MASS INDEX: 24.16 KG/M2 | HEART RATE: 57 BPM | SYSTOLIC BLOOD PRESSURE: 156 MMHG | WEIGHT: 145 LBS | DIASTOLIC BLOOD PRESSURE: 80 MMHG | OXYGEN SATURATION: 99 % | HEIGHT: 65 IN

## 2025-02-28 DIAGNOSIS — R06.02 SHORTNESS OF BREATH: ICD-10-CM

## 2025-02-28 DIAGNOSIS — I10 PRIMARY HYPERTENSION: ICD-10-CM

## 2025-02-28 DIAGNOSIS — I10 BENIGN ESSENTIAL HYPERTENSION: ICD-10-CM

## 2025-02-28 DIAGNOSIS — R00.2 PALPITATIONS: ICD-10-CM

## 2025-02-28 DIAGNOSIS — I49.1 SUPRAVENTRICULAR PREMATURE BEATS: Primary | ICD-10-CM

## 2025-02-28 PROCEDURE — 3079F DIAST BP 80-89 MM HG: CPT | Performed by: INTERNAL MEDICINE

## 2025-02-28 PROCEDURE — 3077F SYST BP >= 140 MM HG: CPT | Performed by: INTERNAL MEDICINE

## 2025-02-28 PROCEDURE — 99214 OFFICE O/P EST MOD 30 MIN: CPT | Performed by: INTERNAL MEDICINE

## 2025-02-28 PROCEDURE — 1160F RVW MEDS BY RX/DR IN RCRD: CPT | Performed by: INTERNAL MEDICINE

## 2025-02-28 PROCEDURE — 1159F MED LIST DOCD IN RCRD: CPT | Performed by: INTERNAL MEDICINE

## 2025-02-28 PROCEDURE — 93000 ELECTROCARDIOGRAM COMPLETE: CPT | Performed by: INTERNAL MEDICINE

## 2025-03-13 ENCOUNTER — TELEPHONE (OUTPATIENT)
Dept: CARDIOLOGY | Facility: CLINIC | Age: 70
End: 2025-03-13

## 2025-03-13 NOTE — TELEPHONE ENCOUNTER
Caller: Emir Kim    Relationship to patient: Self    Best call back number: [phone] LV IF YOU CAN'T REACH PT.    Patient is needing: -PT HAS UPCOMING ECHO AND STRESS TEST APPT AND SHE IS REQUESTING OFFICE TO GIVE HER A CALLBACK ONCE THEY HAVE RECIEVED THOSE RESULTS, APPT'S NOT TIL 3.25.25, AND 3.28.25.    -PT ALSO WILL BE TAKING TRAZIQUANTEL ANTI PERESITIC 3600MG A DAY ALONG WITH ALCENDAZOL 800MG DAILY, ANOTHER ANTI-PERISITIC. PT WILL BE TAKING THIS FOR A 10 DAY PERIOD, PT IS WANTING TO KNOW IF THIS IS SOMETHING SHE COULD DO DUE TO HER HEART ISSUES AND IF IT IS SAFE. PLS CALL & ADVISE.

## 2025-03-15 ENCOUNTER — NURSE TRIAGE (OUTPATIENT)
Dept: CALL CENTER | Facility: HOSPITAL | Age: 70
End: 2025-03-15
Payer: MEDICARE

## 2025-03-15 NOTE — TELEPHONE ENCOUNTER
"She has had C diff - in the past. She has C diff again. She has had 5 to 8 total mush for 3 weeks. She states she needs something to stop this. She thought she was calling [facility] ER. The call was transferred to [facility].   Reason for Disposition   General information question, no triage required and triager able to answer question    Additional Information   Negative: [1] Caller is not with the adult (patient) AND [2] reporting urgent symptoms   Negative: Lab result questions   Negative: Medication questions   Negative: Caller can't be reached by phone   Negative: Caller has already spoken to PCP or another triager   Negative: RN needs further essential information from caller in order to complete triage   Negative: Requesting regular office appointment   Negative: [1] Caller requesting NON-URGENT health information AND [2] PCP's office is the best resource   Negative: Health Information question, no triage required and triager able to answer question    Answer Assessment - Initial Assessment Questions  1. REASON FOR CALL or QUESTION: \"What is your reason for calling today?\" or \"How can I best help you?\" or \"What question do you have that I can help answer?\"      [facility]    Protocols used: Information Only Call - No Triage-ADULT-    "

## 2025-03-21 ENCOUNTER — TELEPHONE (OUTPATIENT)
Dept: CARDIOLOGY | Facility: CLINIC | Age: 70
End: 2025-03-21

## 2025-03-21 NOTE — TELEPHONE ENCOUNTER
Caller: Emir Kim    Relationship to patient: Self    Best call back number: [phone]    Patient is needing: PT WOULD LIKE TO KNOW IF SHE NEEDS A FOLLOW UP FOR HER ECHO AND STRESS TESTS TO GET RESULTS. ALSO PT'S PHONE WILL ONLY ANSWER CALLS FROM CONTACT LIST. PLEASE CALL FROM OFFICE NUMBER TO BE ABLE TO GET THROUGH.

## 2025-03-25 ENCOUNTER — HOSPITAL ENCOUNTER (OUTPATIENT)
Dept: NUCLEAR MEDICINE | Facility: HOSPITAL | Age: 70
Discharge: HOME | End: 2025-03-25
Payer: MEDICARE

## 2025-03-25 DIAGNOSIS — I10 PRIMARY HYPERTENSION: ICD-10-CM

## 2025-03-25 DIAGNOSIS — R06.02 SHORTNESS OF BREATH: ICD-10-CM

## 2025-03-25 DIAGNOSIS — I10 BENIGN ESSENTIAL HYPERTENSION: ICD-10-CM

## 2025-03-25 DIAGNOSIS — R00.2 PALPITATIONS: ICD-10-CM

## 2025-03-25 DIAGNOSIS — I49.1 SUPRAVENTRICULAR PREMATURE BEATS: ICD-10-CM

## 2025-03-25 PROCEDURE — 25010000002 REGADENOSON 0.4 MG/5ML SOLUTION: Performed by: INTERNAL MEDICINE

## 2025-03-25 PROCEDURE — 78452 HT MUSCLE IMAGE SPECT MULT: CPT

## 2025-03-25 PROCEDURE — A9502 TC99M TETROFOSMIN: HCPCS | Performed by: INTERNAL MEDICINE

## 2025-03-25 PROCEDURE — 34310000005 TECHNETIUM TETROFOSMIN KIT: Performed by: INTERNAL MEDICINE

## 2025-03-25 PROCEDURE — 93017 CV STRESS TEST TRACING ONLY: CPT

## 2025-03-25 RX ORDER — REGADENOSON 0.08 MG/ML
0.4 INJECTION, SOLUTION INTRAVENOUS
Status: COMPLETED | OUTPATIENT
Start: 2025-03-25 | End: 2025-03-25

## 2025-03-25 RX ADMIN — TETROFOSMIN 1 DOSE: 1.38 INJECTION, POWDER, LYOPHILIZED, FOR SOLUTION INTRAVENOUS at 13:15

## 2025-03-25 RX ADMIN — TETROFOSMIN 1 DOSE: 1.38 INJECTION, POWDER, LYOPHILIZED, FOR SOLUTION INTRAVENOUS at 13:37

## 2025-03-25 RX ADMIN — REGADENOSON 0.4 MG: 0.08 INJECTION, SOLUTION INTRAVENOUS at 13:37

## 2025-03-28 ENCOUNTER — HOSPITAL ENCOUNTER (OUTPATIENT)
Dept: CARDIOLOGY | Facility: HOSPITAL | Age: 70
Discharge: HOME | End: 2025-03-28
Payer: MEDICARE

## 2025-03-28 VITALS
DIASTOLIC BLOOD PRESSURE: 70 MMHG | HEIGHT: 65 IN | SYSTOLIC BLOOD PRESSURE: 130 MMHG | WEIGHT: 145 LBS | BODY MASS INDEX: 24.16 KG/M2

## 2025-03-28 DIAGNOSIS — I49.1 SUPRAVENTRICULAR PREMATURE BEATS: ICD-10-CM

## 2025-03-28 DIAGNOSIS — R06.02 SHORTNESS OF BREATH: ICD-10-CM

## 2025-03-28 DIAGNOSIS — R00.2 PALPITATIONS: ICD-10-CM

## 2025-03-28 DIAGNOSIS — I10 BENIGN ESSENTIAL HYPERTENSION: ICD-10-CM

## 2025-03-28 DIAGNOSIS — I10 PRIMARY HYPERTENSION: ICD-10-CM

## 2025-03-28 LAB
AORTIC DIMENSIONLESS INDEX: 0.8 (DI)
AV MEAN PRESS GRAD SYS DOP V1V2: 3 MMHG
AV VMAX SYS DOP: 122 CM/SEC
BH CV ECHO LEFT VENTRICLE GLOBAL LONGITUDINAL STRAIN: -17.4 %
BH CV ECHO MEAS - ACS: 1.6 CM
BH CV ECHO MEAS - AI P1/2T: 423.6 MSEC
BH CV ECHO MEAS - AO MAX PG: 6 MMHG
BH CV ECHO MEAS - AO V2 VTI: 28.6 CM
BH CV ECHO MEAS - AVA(I,D): 2.04 CM2
BH CV ECHO MEAS - EDV(CUBED): 59.3 ML
BH CV ECHO MEAS - EDV(MOD-SP4): 70.6 ML
BH CV ECHO MEAS - EF(MOD-SP4): 56.2 %
BH CV ECHO MEAS - ESV(CUBED): 19.7 ML
BH CV ECHO MEAS - ESV(MOD-SP4): 30.9 ML
BH CV ECHO MEAS - FS: 30.8 %
BH CV ECHO MEAS - IVS/LVPW: 1 CM
BH CV ECHO MEAS - IVSD: 1.1 CM
BH CV ECHO MEAS - LA DIMENSION: 3 CM
BH CV ECHO MEAS - LAT PEAK E' VEL: 7.3 CM/SEC
BH CV ECHO MEAS - LV DIASTOLIC VOL/BSA (35-75): 40.9 CM2
BH CV ECHO MEAS - LV MASS(C)D: 140.1 GRAMS
BH CV ECHO MEAS - LV MAX PG: 3.8 MMHG
BH CV ECHO MEAS - LV MEAN PG: 2 MMHG
BH CV ECHO MEAS - LV SYSTOLIC VOL/BSA (12-30): 17.9 CM2
BH CV ECHO MEAS - LV V1 MAX: 96.9 CM/SEC
BH CV ECHO MEAS - LV V1 VTI: 22.9 CM
BH CV ECHO MEAS - LVIDD: 3.9 CM
BH CV ECHO MEAS - LVIDS: 2.7 CM
BH CV ECHO MEAS - LVOT AREA: 2.5 CM2
BH CV ECHO MEAS - LVOT DIAM: 1.8 CM
BH CV ECHO MEAS - LVPWD: 1.1 CM
BH CV ECHO MEAS - MED PEAK E' VEL: 6.6 CM/SEC
BH CV ECHO MEAS - MV A MAX VEL: 87.7 CM/SEC
BH CV ECHO MEAS - MV DEC SLOPE: 205 CM/SEC2
BH CV ECHO MEAS - MV DEC TIME: 0.18 SEC
BH CV ECHO MEAS - MV E MAX VEL: 71 CM/SEC
BH CV ECHO MEAS - MV E/A: 0.81
BH CV ECHO MEAS - MV MAX PG: 4.2 MMHG
BH CV ECHO MEAS - MV MEAN PG: 1 MMHG
BH CV ECHO MEAS - MV P1/2T: 128.3 MSEC
BH CV ECHO MEAS - MV V2 VTI: 31.2 CM
BH CV ECHO MEAS - MVA(P1/2T): 1.71 CM2
BH CV ECHO MEAS - MVA(VTI): 1.87 CM2
BH CV ECHO MEAS - PA ACC TIME: 0.15 SEC
BH CV ECHO MEAS - PA V2 MAX: 75.6 CM/SEC
BH CV ECHO MEAS - PULM A REVS DUR: 0.08 SEC
BH CV ECHO MEAS - PULM A REVS VEL: 24.1 CM/SEC
BH CV ECHO MEAS - PULM DIAS VEL: 28.8 CM/SEC
BH CV ECHO MEAS - PULM S/D: 2.27
BH CV ECHO MEAS - PULM SYS VEL: 65.5 CM/SEC
BH CV ECHO MEAS - RV MAX PG: 1.37 MMHG
BH CV ECHO MEAS - RV V1 MAX: 58.5 CM/SEC
BH CV ECHO MEAS - RV V1 VTI: 13.7 CM
BH CV ECHO MEAS - RVDD: 3.3 CM
BH CV ECHO MEAS - SV(LVOT): 58.3 ML
BH CV ECHO MEAS - SV(MOD-SP4): 39.7 ML
BH CV ECHO MEAS - SVI(LVOT): 33.8 ML/M2
BH CV ECHO MEAS - SVI(MOD-SP4): 23 ML/M2
BH CV ECHO MEAS - TAPSE (>1.6): 1.83 CM
BH CV ECHO MEAS - TR MAX PG: 26 MMHG
BH CV ECHO MEAS - TR MAX VEL: 255 CM/SEC
BH CV ECHO MEASUREMENTS AVERAGE E/E' RATIO: 10.22
BH CV XLRA - TDI S': 8.9 CM/SEC
LV EF 3D SEGMENTATION: 56 %
LV EF BIPLANE MOD: 56 %
SINUS: 2.6 CM
STJ: 2.1 CM

## 2025-03-28 PROCEDURE — 93306 TTE W/DOPPLER COMPLETE: CPT

## 2025-03-28 PROCEDURE — 93356 MYOCRD STRAIN IMG SPCKL TRCK: CPT

## 2025-03-29 LAB
BH CV NUCLEAR PRIOR STUDY: 2
BH CV REST NUCLEAR ISOTOPE DOSE: 11 MCI
BH CV STRESS BP STAGE 1: NORMAL
BH CV STRESS BP STAGE 2: NORMAL
BH CV STRESS BP STAGE 3: NORMAL
BH CV STRESS COMMENTS STAGE 1: NORMAL
BH CV STRESS DOSE REGADENOSON STAGE 1: 0.4
BH CV STRESS DURATION MIN STAGE 1: 1
BH CV STRESS DURATION MIN STAGE 2: 1
BH CV STRESS DURATION MIN STAGE 3: 1
BH CV STRESS DURATION SEC STAGE 1: 10
BH CV STRESS DURATION SEC STAGE 2: 0
BH CV STRESS HR STAGE 1: 88
BH CV STRESS HR STAGE 2: 96
BH CV STRESS HR STAGE 3: 90
BH CV STRESS NUCLEAR ISOTOPE DOSE: 33 MCI
BH CV STRESS PROTOCOL 1: NORMAL
BH CV STRESS RECOVERY BP: NORMAL MMHG
BH CV STRESS RECOVERY HR: 85 BPM
BH CV STRESS STAGE 1: 1
BH CV STRESS STAGE 2: 2
BH CV STRESS STAGE 3: 3
MAXIMAL PREDICTED HEART RATE: 150 BPM
PERCENT MAX PREDICTED HR: 64 %
SPECT HRT GATED+EF W RNC IV: 73 %
STRESS BASELINE BP: NORMAL MMHG
STRESS BASELINE HR: 58 BPM
STRESS PERCENT HR: 75 %
STRESS POST PEAK BP: NORMAL MMHG
STRESS POST PEAK HR: 96 BPM
STRESS TARGET HR: 128 BPM

## 2025-08-22 ENCOUNTER — OFFICE VISIT (OUTPATIENT)
Dept: CARDIOLOGY | Facility: CLINIC | Age: 70
End: 2025-08-22
Payer: MEDICARE

## 2025-08-22 VITALS
OXYGEN SATURATION: 100 % | HEIGHT: 65 IN | RESPIRATION RATE: 16 BRPM | DIASTOLIC BLOOD PRESSURE: 80 MMHG | BODY MASS INDEX: 24.16 KG/M2 | SYSTOLIC BLOOD PRESSURE: 155 MMHG | WEIGHT: 145 LBS | HEART RATE: 55 BPM

## 2025-08-22 DIAGNOSIS — I10 BENIGN ESSENTIAL HYPERTENSION: Primary | ICD-10-CM

## 2025-08-22 PROCEDURE — 1159F MED LIST DOCD IN RCRD: CPT | Performed by: INTERNAL MEDICINE

## 2025-08-22 PROCEDURE — 99213 OFFICE O/P EST LOW 20 MIN: CPT | Performed by: INTERNAL MEDICINE

## 2025-08-22 PROCEDURE — 1160F RVW MEDS BY RX/DR IN RCRD: CPT | Performed by: INTERNAL MEDICINE

## 2025-08-22 PROCEDURE — 3077F SYST BP >= 140 MM HG: CPT | Performed by: INTERNAL MEDICINE

## 2025-08-22 PROCEDURE — 3079F DIAST BP 80-89 MM HG: CPT | Performed by: INTERNAL MEDICINE

## 2025-08-22 RX ORDER — LOSARTAN POTASSIUM 25 MG/1
12.5 TABLET ORAL 2 TIMES DAILY
Qty: 180 TABLET | Refills: 1 | Status: SHIPPED | OUTPATIENT
Start: 2025-08-22